# Patient Record
Sex: FEMALE | Race: WHITE | NOT HISPANIC OR LATINO | Employment: OTHER | URBAN - NONMETROPOLITAN AREA
[De-identification: names, ages, dates, MRNs, and addresses within clinical notes are randomized per-mention and may not be internally consistent; named-entity substitution may affect disease eponyms.]

---

## 2019-03-27 ENCOUNTER — OFFICE VISIT (OUTPATIENT)
Dept: SURGERY | Facility: CLINIC | Age: 69
End: 2019-03-27

## 2019-03-27 VITALS
TEMPERATURE: 97.7 F | HEART RATE: 81 BPM | OXYGEN SATURATION: 94 % | HEIGHT: 61 IN | SYSTOLIC BLOOD PRESSURE: 138 MMHG | DIASTOLIC BLOOD PRESSURE: 76 MMHG | BODY MASS INDEX: 29.83 KG/M2 | WEIGHT: 158 LBS

## 2019-03-27 DIAGNOSIS — R22.2 CHEST WALL MASS: Primary | ICD-10-CM

## 2019-03-27 PROCEDURE — 99212 OFFICE O/P EST SF 10 MIN: CPT | Performed by: SURGERY

## 2019-03-27 RX ORDER — EZETIMIBE 10 MG/1
TABLET ORAL DAILY
COMMUNITY
Start: 2016-01-02 | End: 2019-09-30

## 2019-03-27 RX ORDER — ATORVASTATIN CALCIUM 40 MG/1
TABLET, FILM COATED ORAL
Refills: 5 | COMMUNITY
Start: 2019-03-01 | End: 2019-09-30 | Stop reason: DRUGHIGH

## 2019-03-27 RX ORDER — SERTRALINE HYDROCHLORIDE 100 MG/1
TABLET, FILM COATED ORAL
Refills: 1 | COMMUNITY
Start: 2019-01-24

## 2019-03-27 RX ORDER — BISOPROLOL FUMARATE 5 MG/1
TABLET, FILM COATED ORAL
Refills: 3 | COMMUNITY
Start: 2019-02-21

## 2019-03-27 RX ORDER — DEXTROMETHORPHAN HYDROBROMIDE AND PROMETHAZINE HYDROCHLORIDE 15; 6.25 MG/5ML; MG/5ML
SYRUP ORAL
Refills: 0 | COMMUNITY
Start: 2019-03-12 | End: 2019-09-30

## 2019-03-27 RX ORDER — TRAZODONE HYDROCHLORIDE 100 MG/1
TABLET ORAL
Refills: 3 | COMMUNITY
Start: 2019-03-01

## 2019-03-27 RX ORDER — CALCIUM CITRATE/VITAMIN D3 200MG-6.25
TABLET ORAL
Refills: 11 | COMMUNITY
Start: 2019-03-12

## 2019-03-27 RX ORDER — SERTRALINE HYDROCHLORIDE 100 MG/1
TABLET, FILM COATED ORAL DAILY
COMMUNITY
Start: 2016-01-02 | End: 2019-09-30

## 2019-03-27 RX ORDER — OMEPRAZOLE 40 MG/1
CAPSULE, DELAYED RELEASE ORAL
Refills: 0 | COMMUNITY
Start: 2019-02-21

## 2019-03-27 RX ORDER — GLUCOSAM/CHON-MSM1/C/MANG/BOSW 500-416.6
TABLET ORAL
Refills: 11 | COMMUNITY
Start: 2019-03-12

## 2019-03-27 NOTE — PROGRESS NOTES
Patient: Lucy Pedro    YOB: 1950    Date: 03/27/2019    Primary Care Provider: Debbi Yu MD    Reason for consultation: Chest wall mass     Chief Complaint   Patient presents with   • Mass     Chest wall        Subjective .     History of present illness:  I saw the patient in the office  today for evaluation and treatment of mass chest wall. Patient states onset of mass 5 to 6 years however it has became larger within the past 2 months. Patient did have mammogram 2 weeks ago with BI-RADS 2. She denies any nipple drainage, discoloration or pain at this time.     Review of Systems   Constitutional: Negative for chills, fever and unexpected weight change.   HENT: Negative for hearing loss, trouble swallowing and voice change.    Eyes: Negative for visual disturbance.   Respiratory: Negative for apnea, cough, chest tightness, shortness of breath and wheezing.    Cardiovascular: Negative for chest pain, palpitations and leg swelling.   Gastrointestinal: Negative for abdominal distention, abdominal pain, anal bleeding, blood in stool, constipation, diarrhea, nausea, rectal pain and vomiting.   Endocrine: Negative for cold intolerance and heat intolerance.   Genitourinary: Negative for difficulty urinating, dysuria and flank pain.   Musculoskeletal: Negative for back pain and gait problem.   Skin: Negative for color change, rash and wound.   Neurological: Negative for dizziness, syncope, speech difficulty, weakness, light-headedness, numbness and headaches.   Hematological: Negative for adenopathy. Does not bruise/bleed easily.   Psychiatric/Behavioral: Negative for confusion. The patient is not nervous/anxious.        History:  Past Medical History:   Diagnosis Date   • Diabetes mellitus (CMS/HCC)    • Hyperlipidemia    • Hypertension           Past Surgical History:   Procedure Laterality Date   • CARPAL TUNNEL RELEASE     • CHOLECYSTECTOMY     • CYST REMOVAL     • HERNIA REPAIR    "  • HYSTERECTOMY     • KNEE SURGERY      bilateral       History reviewed. No pertinent family history.    Social History     Tobacco Use   • Smoking status: Former Smoker   Substance Use Topics   • Alcohol use: No     Frequency: Never   • Drug use: No       Allergies:  Allergies   Allergen Reactions   • Penicillins Hives       Medications:     Current Outpatient Medications:   •  ezetimibe (ZETIA) 10 MG tablet, Take  by mouth Daily., Disp: , Rfl:   •  sertraline (ZOLOFT) 100 MG tablet, Take  by mouth Daily., Disp: , Rfl:   •  atorvastatin (LIPITOR) 20 MG tablet, TAKE 1 TABLET EVERY EVENING TO LOWER CHOLESTEROL AND OR TRIGLYCERIDES, Disp: , Rfl: 5  •  bisoprolol (ZEBeta) 5 MG tablet, TAKE 1 TABLET EVERY DAY FOR BLOOD PRESSURE, Disp: , Rfl: 3  •  metFORMIN (GLUCOPHAGE) 850 MG tablet, TAKE 1 TABLET 2 TIMES A DAY WITH MORNING AND EVENING MEALS TO HELP BODY USE INSULIN BETTER AND TO LOWER BLOOD SUGAR, Disp: , Rfl: 5  •  omeprazole (priLOSEC) 40 MG capsule, TAKE 1 CAPSULE BY MOUTH DAILY AS NEEDED FOR HEART BURN, Disp: , Rfl: 0  •  promethazine-dextromethorphan (PROMETHAZINE-DM) 6.25-15 MG/5ML syrup, TAKE 5 ML BY MOUTH EVERY 6 HOURS AS NEEDED   MAY CAUSE DROWSINESS, Disp: , Rfl: 0  •  sertraline (ZOLOFT) 100 MG tablet, TAKE 1 TABLET EVERY DAY FOR MOOD AND OR ANXIETY, Disp: , Rfl: 1  •  traZODone (DESYREL) 100 MG tablet, TAKE 1 TABLET EVERY NIGHT AT BEDTIME FOR SLEEP, Disp: , Rfl: 3  •  TRUE METRIX BLOOD GLUCOSE TEST test strip, USE TO CHECK BLOOD SUGAR ONCE A DAY, Disp: , Rfl: 11  •  TRUEPLUS LANCETS 30G misc, USE TO TEST ONCE DAILY AS DIRECTED, Disp: , Rfl: 11    Objective     Vital Signs:   Vitals:    03/27/19 1335   BP: 138/76   Pulse: 81   Temp: 97.7 °F (36.5 °C)   SpO2: 94%   Weight: 71.7 kg (158 lb)   Height: 154.9 cm (61\")       Physical Exam:     General Appearance:    Alert, cooperative, in no acute distress   Head:    Normocephalic, without obvious abnormality, atraumatic   Eyes:            Lids and lashes " normal, conjunctivae and sclerae normal, no   icterus, no pallor, corneas clear,   Ears:    Ears appear intact with no abnormalities noted   Throat:   No oral lesions, no thrush, oral mucosa moist   Breast:     No palpable lesions present, lipoma present in mid sternal region   Lungs:     Clear to auscultation,respirations regular, even and                  Unlabored    Heart:    Regular rhythm and normal rate, no murmur, no gallop.   Chest Wall:    No abnormalities observed   Abdomen:     Normal bowel sounds, no masses, no organomegaly, soft        non-tender, non-distended, no guarding.   Extremities:   Moves all extremities well, no edema, no cyanosis, no             redness   Pulses:   Pulses palpable and equal bilaterally   Skin:   No bleeding, bruising or rash   Lymph nodes:   No palpable adenopathy   Neurologic:   Cranial nerves 2 - 12 grossly intact.           Results Review:   I reviewed the patient's new clinical results.  I reviewed the patient's new imaging results and agree with the interpretation.  I reviewed the patient's other test results and agree with the interpretation      Assessment / Plan:    1. Chest wall mass        I did have a detailed and extensive discussion with the patient in the office today and reviewed her recent workup.  I have told the patient that she needs to undergo excision of this lesion.  Risks and benefits discussed with her, she understands and agrees.    Electronically signed by Torsten Collins MD  03/28/19  11:48 AM

## 2019-04-18 ENCOUNTER — PROCEDURE VISIT (OUTPATIENT)
Dept: SURGERY | Facility: CLINIC | Age: 69
End: 2019-04-18

## 2019-04-18 VITALS
SYSTOLIC BLOOD PRESSURE: 140 MMHG | HEIGHT: 61 IN | OXYGEN SATURATION: 98 % | TEMPERATURE: 98 F | WEIGHT: 158.8 LBS | DIASTOLIC BLOOD PRESSURE: 80 MMHG | HEART RATE: 86 BPM | BODY MASS INDEX: 29.98 KG/M2

## 2019-04-18 DIAGNOSIS — R22.2 MASS OF CHEST WALL: Primary | ICD-10-CM

## 2019-04-18 DIAGNOSIS — L98.9 SKIN LESION: Primary | ICD-10-CM

## 2019-04-18 PROCEDURE — 11404 EXC TR-EXT B9+MARG 3.1-4 CM: CPT | Performed by: SURGERY

## 2019-04-18 PROCEDURE — 12032 INTMD RPR S/A/T/EXT 2.6-7.5: CPT | Performed by: SURGERY

## 2019-04-18 NOTE — PROGRESS NOTES
Location:chest wall mass    Procedure:I saw the patient in the office today for an excision mass @ chest wall.  Patient has given proper consent.       I recommend excision. Procedure and the risks and benefits were explained including bleeding and infection. The patient understands these and wishes to proceed.     The patient was brought to the procedure room. Consent and time out were performed. The area was prepped and draped in the usual fashion. 1% lidocaine with epinephrine was infused locally. An incision was made over the lesion. Excision was performed. The lesion size was 3.6 cm. The wound was closed in layers with interrupted simple vicryl and Nylon for the skin. Wound closure size was 4 cm. There were no complications and the patient tolerated the procedure well. Hemostasis was well controlled with pressure and there was minimal blood loss. Wound instructions were given.

## 2019-04-25 ENCOUNTER — OFFICE VISIT (OUTPATIENT)
Dept: SURGERY | Facility: CLINIC | Age: 69
End: 2019-04-25

## 2019-04-25 VITALS
TEMPERATURE: 98.4 F | SYSTOLIC BLOOD PRESSURE: 138 MMHG | HEIGHT: 61 IN | HEART RATE: 80 BPM | OXYGEN SATURATION: 98 % | BODY MASS INDEX: 29.98 KG/M2 | WEIGHT: 158.8 LBS | DIASTOLIC BLOOD PRESSURE: 88 MMHG

## 2019-04-25 DIAGNOSIS — Z48.89 POSTOPERATIVE VISIT: Primary | ICD-10-CM

## 2019-04-25 PROCEDURE — 99024 POSTOP FOLLOW-UP VISIT: CPT | Performed by: SURGERY

## 2019-04-25 NOTE — PROGRESS NOTES
"Patient: Lucy Pedro    YOB: 1950    Date: 04/25/2019    Primary Care Provider: Debbi Yu MD    Reason for Consultation: Follow-up lesion excision    Chief Complaint   Patient presents with   • Post-op       History of present illness:  I saw the patient in the office today as a followup from their recent lesion excision @ chest wall, the pathology report did show lipoma.  They state that they have done well and are having no problems.    The following portions of the patient's history were reviewed and updated as appropriate: allergies, current medications, past family history, past medical history, past social history, past surgical history and problem list.      Vital Signs:  Vitals:    04/25/19 1420   BP: 138/88   Pulse: 80   Temp: 98.4 °F (36.9 °C)   SpO2: 98%   Weight: 72 kg (158 lb 12.8 oz)   Height: 154.9 cm (61\")       Physical Exam:   General Appearance:    Alert, cooperative, in no acute distress, wound clean dry without infection   Abdomen:     no masses, no organomegaly, soft non-tender, non-distended, no guarding, wounds are well healed   Chest:      Clear to ausculation       Assessment / Plan:    1. Postoperative visit        I did discuss the situation with the patient today in the office and they have done well from their recent lesion excision, I don't think that the patient needs any further intervention and I need to see them back only if they have further problems. Pathology report was reviewed with the patient in the office.    Electronically signed by Torsten Collins MD  04/25/19                    "

## 2019-09-30 ENCOUNTER — OFFICE VISIT (OUTPATIENT)
Dept: GASTROENTEROLOGY | Facility: CLINIC | Age: 69
End: 2019-09-30

## 2019-09-30 VITALS
BODY MASS INDEX: 28.32 KG/M2 | HEART RATE: 71 BPM | HEIGHT: 61 IN | WEIGHT: 150 LBS | DIASTOLIC BLOOD PRESSURE: 56 MMHG | TEMPERATURE: 98.1 F | SYSTOLIC BLOOD PRESSURE: 126 MMHG | RESPIRATION RATE: 18 BRPM

## 2019-09-30 DIAGNOSIS — R63.4 WEIGHT LOSS: Chronic | ICD-10-CM

## 2019-09-30 DIAGNOSIS — R19.7 DIARRHEA, UNSPECIFIED TYPE: Primary | Chronic | ICD-10-CM

## 2019-09-30 DIAGNOSIS — Z12.11 COLON CANCER SCREENING: ICD-10-CM

## 2019-09-30 DIAGNOSIS — Z80.0 FAMILY HISTORY OF COLON CANCER IN FATHER: Chronic | ICD-10-CM

## 2019-09-30 DIAGNOSIS — R12 HEARTBURN: Chronic | ICD-10-CM

## 2019-09-30 PROBLEM — K57.30 DIVERTICULOSIS OF LARGE INTESTINE: Status: ACTIVE | Noted: 2019-09-30

## 2019-09-30 PROBLEM — K63.5 COLON POLYP: Status: ACTIVE | Noted: 2019-09-30

## 2019-09-30 PROCEDURE — 99204 OFFICE O/P NEW MOD 45 MIN: CPT | Performed by: NURSE PRACTITIONER

## 2019-09-30 RX ORDER — EZETIMIBE 10 MG/1
10 TABLET ORAL DAILY
COMMUNITY

## 2019-09-30 RX ORDER — SODIUM CHLORIDE 9 MG/ML
70 INJECTION, SOLUTION INTRAVENOUS CONTINUOUS PRN
Status: CANCELLED | OUTPATIENT
Start: 2019-09-30

## 2019-09-30 RX ORDER — MECLIZINE HCL 25MG 25 MG/1
25 TABLET, CHEWABLE ORAL 3 TIMES DAILY PRN
COMMUNITY

## 2019-09-30 RX ORDER — ASPIRIN 81 MG/1
81 TABLET ORAL NIGHTLY
COMMUNITY

## 2019-09-30 RX ORDER — ATORVASTATIN CALCIUM 20 MG/1
80 TABLET, FILM COATED ORAL EVERY EVENING
Refills: 5 | COMMUNITY
Start: 2019-09-03

## 2019-09-30 NOTE — PATIENT INSTRUCTIONS
1. Antireflux measures: Avoid fried, fatty foods, alcohol, chocolate, coffee, tea,  soft drinks, peppermint and spearmint, spicy foods, tomatoes and tomato based foods, onion based foods, and smoking. Other antireflux measures include weight reduction if overweight, avoiding tight clothing around the abdomen, elevating the head of the bed 6 inches with blocks under the head board, and don't drink or eat before going to bed and avoid lying down immediately after meals.  2. Omeprazole 40 mg 1 po daily in the am 30 minutes before breakfast.   3. Colonoscopy: Description of the procedure, risks, benefits, alternatives and options, including nonoperative options, were discussed with the patient in detail. The patient understands and wishes to proceed.

## 2019-09-30 NOTE — PROGRESS NOTES
Chief Complaint   Patient presents with   • Establish Care     Colon Screen    • Heartburn   • Diarrhea   • Weight Loss     There is a long standing history of diarrhea. The patient has diarrhea 4-5 days per week with 3-4 episodes per day. Stools are loose that will progress to watery. She has incontinence of bowels at times since she had hemorrhoid surgery in 2002. Eating triggers diarrhea. The patient takes Imodium as needed with significant control of diarrhea. There is no history of bright red blood per rectum or melena. There is no history of abdominal pain. Of interest, the patient takes Metformin.    The patient has lost about 20 pounds over the past year or so. Worsening over the past 6 months. This is described as unintentional. The patient denies nausea or vomiting.    There is a long standing history of heartburn. The patient takes Omeprazole with reasonable control of heartburn. Heartburn is moderate. Reflux is not worse at night. There is no history of difficulty swallowing.    The patient's last colonoscopy was in 2016. There is a family history of colon cancer in the patient's father.    Diarrhea    This is a chronic problem. Episode onset: over 5 years. Episode frequency: 4-5 days per week with 3-4 episodes per day. The problem has been unchanged. Diarrhea characteristics: loose progressing to watery. The patient states that diarrhea does not awaken her from sleep. Associated symptoms include arthralgias, coughing, headaches, myalgias and weight loss. Pertinent negatives include no abdominal pain, chills, fever or vomiting. Nothing aggravates the symptoms. There are no known risk factors. She has tried anti-motility drug for the symptoms. The treatment provided significant relief.   Heartburn   She complains of coughing and heartburn. She reports no abdominal pain, no chest pain or no nausea. This is a chronic problem. Episode onset: over 5 years ago. The problem occurs occasionally. The problem has  been unchanged. The heartburn duration is an hour. The heartburn is located in the substernum. The heartburn is of moderate intensity. The heartburn does not wake her from sleep. Nothing aggravates the symptoms. Associated symptoms include fatigue and weight loss. There are no known risk factors. She has tried a PPI for the symptoms. The treatment provided significant relief. Past procedures include an EGD (2016).   Weight Loss   This is a chronic problem. The current episode started more than 1 year ago. The problem occurs intermittently. The problem has been gradually worsening. Associated symptoms include arthralgias, coughing, fatigue, headaches, joint swelling and myalgias. Pertinent negatives include no abdominal pain, chest pain, chills, fever, nausea, rash or vomiting. Nothing aggravates the symptoms. She has tried nothing for the symptoms.     Review of Systems   Constitutional: Positive for fatigue, unexpected weight change and weight loss. Negative for appetite change, chills and fever.   HENT: Negative for mouth sores, nosebleeds and trouble swallowing.    Eyes: Negative for discharge and redness.   Respiratory: Positive for cough and shortness of breath. Negative for apnea.    Cardiovascular: Positive for leg swelling. Negative for chest pain and palpitations.   Gastrointestinal: Positive for diarrhea and heartburn. Negative for abdominal distention, abdominal pain, anal bleeding, blood in stool, constipation, nausea and vomiting.   Endocrine: Positive for polydipsia. Negative for cold intolerance and heat intolerance.   Genitourinary: Negative for dysuria, hematuria and urgency.   Musculoskeletal: Positive for arthralgias, joint swelling and myalgias.   Skin: Negative for rash.   Allergic/Immunologic: Positive for food allergies (cabbage, wheat, cucumbers, white potatoes). Negative for immunocompromised state.   Neurological: Positive for dizziness and headaches. Negative for seizures and syncope.    Hematological: Negative for adenopathy. Bruises/bleeds easily.   Psychiatric/Behavioral: Negative for dysphoric mood. The patient is nervous/anxious. The patient is not hyperactive.      Patient Active Problem List   Diagnosis   • Colon polyp   • Diarrhea   • Diverticulosis of large intestine   • Heartburn   • Weight loss   • Family history of colon cancer in father     Past Medical History:   Diagnosis Date   • Arthritis    • Back pain    • Back pain    • Colon polyp 02/24/2016   • Depression 1998   • Diabetes mellitus (CMS/HCC) 2002    Type 2   • Dizziness    • GERD (gastroesophageal reflux disease)    • High triglycerides 1998   • History of cataract    • History of kidney stones    • History of stroke    • Hyperlipidemia 1998   • Hypertension    • Kidney failure 2018   • Osteoarthritis    • PTSD (post-traumatic stress disorder) 1998     Past Surgical History:   Procedure Laterality Date   • APPENDECTOMY  1979   • CARDIAC PACEMAKER PLACEMENT  04/01/2011    [facility]   • CARPAL TUNNEL RELEASE     • CATARACT EXTRACTION     • CHOLECYSTECTOMY  2000   • COLONOSCOPY  02/24/2016   • CYST REMOVAL     • EYE SURGERY Bilateral 2006    Cataract    • HEMORRHOIDECTOMY  2002   • HERNIA REPAIR     • HERNIA REPAIR Right 2009   • HYSTERECTOMY     • HYSTERECTOMY  1979   • KNEE SURGERY Bilateral     1991 and 2003   • PACEMAKER IMPLANTATION  2011   • UPPER GASTROINTESTINAL ENDOSCOPY  03/02/2016     Family History   Problem Relation Age of Onset   • No Known Problems Mother    • Colon cancer Father    • Stomach cancer Paternal Aunt    • Ovarian cancer Paternal Aunt      Social History     Tobacco Use   • Smoking status: Current Every Day Smoker     Packs/day: 0.50     Types: Cigarettes   • Smokeless tobacco: Never Used   Substance Use Topics   • Alcohol use: Yes     Comment: HX: 2006       Current Outpatient Medications:   •  aspirin 81 MG EC tablet, Take 81 mg by mouth Daily., Disp: , Rfl:   •  atorvastatin (LIPITOR) 20 MG tablet, Take  "20 mg by mouth Every Evening., Disp: , Rfl: 5  •  bisoprolol (ZEBeta) 5 MG tablet, TAKE 1 TABLET EVERY DAY FOR BLOOD PRESSURE, Disp: , Rfl: 3  •  diphenhydrAMINE HCl (BENADRYL ALLERGY PO), Take  by mouth., Disp: , Rfl:   •  ezetimibe (ZETIA) 10 MG tablet, Take 10 mg by mouth Daily., Disp: , Rfl:   •  meclizine 25 MG chewable tablet chewable tablet, Chew 25 mg 3 (Three) Times a Day As Needed., Disp: , Rfl:   •  metFORMIN (GLUCOPHAGE) 850 MG tablet, TAKE 1 TABLET 2 TIMES A DAY WITH MORNING AND EVENING MEALS TO HELP BODY USE INSULIN BETTER AND TO LOWER BLOOD SUGAR, Disp: , Rfl: 5  •  omeprazole (priLOSEC) 40 MG capsule, TAKE 1 CAPSULE BY MOUTH DAILY AS NEEDED FOR HEART BURN, Disp: , Rfl: 0  •  sertraline (ZOLOFT) 100 MG tablet, TAKE 1 TABLET EVERY DAY FOR MOOD AND OR ANXIETY, Disp: , Rfl: 1  •  traZODone (DESYREL) 100 MG tablet, TAKE 1 TABLET EVERY NIGHT AT BEDTIME FOR SLEEP, Disp: , Rfl: 3  •  TRUE METRIX BLOOD GLUCOSE TEST test strip, USE TO CHECK BLOOD SUGAR ONCE A DAY, Disp: , Rfl: 11  •  TRUEPLUS LANCETS 30G misc, USE TO TEST ONCE DAILY AS DIRECTED, Disp: , Rfl: 11    Allergies   Allergen Reactions   • Diclofenac Unknown (See Comments)     Causing patient kidney failure    • Penicillins Hives   • Wheat Bran Unknown (See Comments)     ADDITIONAL FOOD ALLERGIES PER PATIENT: cabbage, wheat, cucumbers, white potatoes     Blood pressure 126/56, pulse 71, temperature 98.1 °F (36.7 °C), resp. rate 18, height 154.9 cm (61\"), weight 68 kg (150 lb), not currently breastfeeding.    Physical Exam   Constitutional: She is oriented to person, place, and time. She appears well-developed and well-nourished. No distress.   HENT:   Head: Normocephalic and atraumatic.   Right Ear: Hearing and external ear normal.   Left Ear: Hearing and external ear normal.   Nose: Nose normal.   Mouth/Throat: Oropharynx is clear and moist and mucous membranes are normal. Mucous membranes are not pale, not dry and not cyanotic. No oral lesions. No " oropharyngeal exudate.   Eyes: Conjunctivae and EOM are normal. Right eye exhibits no discharge. Left eye exhibits no discharge.   Neck: Trachea normal. Neck supple. No JVD present. No edema present. No thyroid mass and no thyromegaly present.   Cardiovascular: Normal rate, regular rhythm, S2 normal and normal heart sounds. Exam reveals no gallop, no S3 and no friction rub.   No murmur heard.  Pulmonary/Chest: Effort normal and breath sounds normal. No respiratory distress. She exhibits no tenderness.   Abdominal: Normal appearance and bowel sounds are normal. She exhibits no distension, no ascites and no mass. There is no splenomegaly or hepatomegaly. There is no tenderness. There is no rigidity, no rebound and no guarding. No hernia.     Vascular Status -  Her right foot exhibits no edema. Her left foot exhibits no edema.  Lymphadenopathy:     She has no cervical adenopathy.        Left: No supraclavicular adenopathy present.   Neurological: She is alert and oriented to person, place, and time. She has normal strength. No cranial nerve deficit or sensory deficit.   Skin: No rash noted. She is not diaphoretic. No cyanosis. No pallor. Nails show no clubbing.   Psychiatric: She has a normal mood and affect.   Nursing note and vitals reviewed.  Stigmata of chronic liver disease:  None.  Asterixis:  None.    Laboratory Tests:   Upon review of records:    Dated 8/21/2019 glucose 95 BUN 12 creatinine 0.95 sodium 143 potassium 4.5 chloride 112 CO2 17 calcium 9.3 albumin 4.4 total bilirubin 0.3 alkaline phosphatase 78 ALT 15 AST 21 WBC 9.4 hemoglobin 12.4 hematocrit 40.2 platelet count 253 MCV 95 hemoglobin A1c 6.1 TSH 1.060    Procedures:  Upon review of records:    Colonoscopy dated 02/24/2016: Scant early diverticular change in the left colon. Colon polyps. Evidence of healed ulceration within the rectum. Minimal linear erythema within the rectum. Internal hemorrhoids. Cecum polyp, biopsy revealed tubular adenoma  fragments without high-grade dysplasia. Transverse colon polyps, biopsy revealed tubular adenoma fragments without high-grade dysplasia. Rectum, biopsies revealed mucosal prolapse changes. Mucosal lymphoid aggregates. Random colon biopsy revealed mucosal prolapse changes with reactive lymphoid aggregate.     EGD dated 03/02/2016: Erythematous gastritis involving the antrum and body of the stomach. Small sliding hiatal hernia less than 3 cm. Nonobstructive Schatzki's-type ring. No Jeff's esophagus. Second portion of duodenum, biopsy revealed no pathologic alterations. Negative for celiac disease, microorganisms, metaplasia or atypia. Antrum and body, and angulus, biopsies revealed reactive gastropathy (antrum). Minimal chronic gastritis (body). Negative for H. pylori, metaplasia, dysplasia or malignancy.    Assessment:      ICD-10-CM ICD-9-CM   1. Diarrhea, unspecified type R19.7 787.91   2. Weight loss R63.4 783.21   3. Family history of colon cancer in father Z80.0 V16.0   4. Colon cancer screening Z12.11 V76.51   5. Heartburn R12 787.1       Plan/  Patient Instructions   1. Antireflux measures: Avoid fried, fatty foods, alcohol, chocolate, coffee, tea,  soft drinks, peppermint and spearmint, spicy foods, tomatoes and tomato based foods, onion based foods, and smoking. Other antireflux measures include weight reduction if overweight, avoiding tight clothing around the abdomen, elevating the head of the bed 6 inches with blocks under the head board, and don't drink or eat before going to bed and avoid lying down immediately after meals.  2. Omeprazole 40 mg 1 po daily in the am 30 minutes before breakfast.   3. Colonoscopy: Description of the procedure, risks, benefits, alternatives and options, including nonoperative options, were discussed with the patient in detail. The patient understands and wishes to proceed.     Emeli Woodward, RUPINDER

## 2019-11-07 PROBLEM — Z12.11 COLON CANCER SCREENING: Status: ACTIVE | Noted: 2019-11-07

## 2019-11-21 ENCOUNTER — ANESTHESIA EVENT (OUTPATIENT)
Dept: GASTROENTEROLOGY | Facility: HOSPITAL | Age: 69
End: 2019-11-21

## 2019-11-21 ENCOUNTER — ANESTHESIA (OUTPATIENT)
Dept: GASTROENTEROLOGY | Facility: HOSPITAL | Age: 69
End: 2019-11-21

## 2019-11-21 ENCOUNTER — HOSPITAL ENCOUNTER (OUTPATIENT)
Facility: HOSPITAL | Age: 69
Setting detail: HOSPITAL OUTPATIENT SURGERY
Discharge: HOME | End: 2019-11-21
Attending: INTERNAL MEDICINE | Admitting: INTERNAL MEDICINE

## 2019-11-21 VITALS
BODY MASS INDEX: 27.38 KG/M2 | DIASTOLIC BLOOD PRESSURE: 70 MMHG | SYSTOLIC BLOOD PRESSURE: 98 MMHG | WEIGHT: 145 LBS | OXYGEN SATURATION: 95 % | HEART RATE: 70 BPM | RESPIRATION RATE: 18 BRPM | HEIGHT: 61 IN | TEMPERATURE: 98 F

## 2019-11-21 DIAGNOSIS — R63.4 WEIGHT LOSS: ICD-10-CM

## 2019-11-21 DIAGNOSIS — Z80.0 FAMILY HISTORY OF COLON CANCER IN FATHER: ICD-10-CM

## 2019-11-21 DIAGNOSIS — Z12.11 COLON CANCER SCREENING: ICD-10-CM

## 2019-11-21 DIAGNOSIS — R19.7 DIARRHEA, UNSPECIFIED TYPE: ICD-10-CM

## 2019-11-21 LAB — GLUCOSE BLDC GLUCOMTR-MCNC: 115 MG/DL (ref 70–130)

## 2019-11-21 PROCEDURE — 25010000002 ONDANSETRON PER 1 MG: Performed by: NURSE ANESTHETIST, CERTIFIED REGISTERED

## 2019-11-21 PROCEDURE — 25010000002 PROPOFOL 200 MG/20ML EMULSION: Performed by: NURSE ANESTHETIST, CERTIFIED REGISTERED

## 2019-11-21 PROCEDURE — S0260 H&P FOR SURGERY: HCPCS | Performed by: INTERNAL MEDICINE

## 2019-11-21 PROCEDURE — 45380 COLONOSCOPY AND BIOPSY: CPT | Performed by: INTERNAL MEDICINE

## 2019-11-21 PROCEDURE — 82962 GLUCOSE BLOOD TEST: CPT

## 2019-11-21 RX ORDER — CHOLESTYRAMINE 4 G/9G
4 POWDER, FOR SUSPENSION ORAL NIGHTLY
Qty: 30 EACH | Refills: 1 | Status: SHIPPED | OUTPATIENT
Start: 2019-11-21 | End: 2019-12-19 | Stop reason: SINTOL

## 2019-11-21 RX ORDER — ONDANSETRON 2 MG/ML
INJECTION INTRAMUSCULAR; INTRAVENOUS AS NEEDED
Status: DISCONTINUED | OUTPATIENT
Start: 2019-11-21 | End: 2019-11-21 | Stop reason: SURG

## 2019-11-21 RX ORDER — LIDOCAINE HYDROCHLORIDE 20 MG/ML
INJECTION, SOLUTION INTRAVENOUS AS NEEDED
Status: DISCONTINUED | OUTPATIENT
Start: 2019-11-21 | End: 2019-11-21 | Stop reason: SURG

## 2019-11-21 RX ORDER — SODIUM CHLORIDE 9 MG/ML
70 INJECTION, SOLUTION INTRAVENOUS CONTINUOUS PRN
Status: DISCONTINUED | OUTPATIENT
Start: 2019-11-21 | End: 2019-11-21 | Stop reason: HOSPADM

## 2019-11-21 RX ORDER — LIDOCAINE 50 MG/G
OINTMENT TOPICAL AS NEEDED
Status: DISCONTINUED | OUTPATIENT
Start: 2019-11-21 | End: 2019-11-21 | Stop reason: HOSPADM

## 2019-11-21 RX ORDER — SIMETHICONE 20 MG/.3ML
EMULSION ORAL AS NEEDED
Status: DISCONTINUED | OUTPATIENT
Start: 2019-11-21 | End: 2019-11-21 | Stop reason: HOSPADM

## 2019-11-21 RX ORDER — PROPOFOL 10 MG/ML
INJECTION, EMULSION INTRAVENOUS AS NEEDED
Status: DISCONTINUED | OUTPATIENT
Start: 2019-11-21 | End: 2019-11-21 | Stop reason: SURG

## 2019-11-21 RX ADMIN — ONDANSETRON 4 MG: 2 INJECTION INTRAMUSCULAR; INTRAVENOUS at 09:48

## 2019-11-21 RX ADMIN — SODIUM CHLORIDE 70 ML/HR: 9 INJECTION, SOLUTION INTRAVENOUS at 09:31

## 2019-11-21 RX ADMIN — PROPOFOL 50 MG: 10 INJECTION, EMULSION INTRAVENOUS at 09:51

## 2019-11-21 RX ADMIN — PROPOFOL 40 MG: 10 INJECTION, EMULSION INTRAVENOUS at 09:48

## 2019-11-21 RX ADMIN — PROPOFOL 50 MG: 10 INJECTION, EMULSION INTRAVENOUS at 09:55

## 2019-11-21 RX ADMIN — PROPOFOL 50 MG: 10 INJECTION, EMULSION INTRAVENOUS at 10:00

## 2019-11-21 RX ADMIN — PROPOFOL 60 MG: 10 INJECTION, EMULSION INTRAVENOUS at 09:44

## 2019-11-21 RX ADMIN — LIDOCAINE HYDROCHLORIDE 40 MG: 20 INJECTION, SOLUTION INTRAVENOUS at 09:44

## 2019-11-21 NOTE — ANESTHESIA POSTPROCEDURE EVALUATION
Patient: Lucy Pedro    Procedure Summary     Date:  11/21/19 Room / Location:  [facility] ENDOSCOPY 2 / [facility] ENDOSCOPY    Anesthesia Start:  0937 Anesthesia Stop:  1017    Procedure:  COLONOSCOPY WITH COLD BIOPSY POLYPECTOMY AND BIOPSIES (N/A Anus) Diagnosis:       Diverticulosis      Colon polyps      Internal hemorrhoid      (Diarrhea, unspecified type [R19.7])      (Weight loss [R63.4])      (Family history of colon cancer in father [Z80.0])      (Colon cancer screening [Z12.11])    Surgeon:  Qamar Baird MD Provider:  Fernando Sanchez CRNA    Anesthesia Type:  MAC ASA Status:  3          Anesthesia Type: MAC  Last vitals  BP   98/70 (11/21/19 1050)   Temp   98 °F (36.7 °C) (11/21/19 1020)   Pulse   70 (11/21/19 1050)   Resp   18 (11/21/19 1050)     SpO2   95 % (11/21/19 1050)     Post Anesthesia Care and Evaluation    Patient location during evaluation: bedside  Patient participation: complete - patient participated  Level of consciousness: awake and sleepy but conscious  Pain management: adequate  Airway patency: patent  Anesthetic complications: No anesthetic complications  PONV Status: none  Cardiovascular status: acceptable and hemodynamically stable  Respiratory status: acceptable, room air, nonlabored ventilation and spontaneous ventilation  Hydration status: acceptable

## 2019-11-27 LAB
LAB AP CASE REPORT: NORMAL
PATH REPORT.FINAL DX SPEC: NORMAL

## 2019-12-19 ENCOUNTER — OFFICE VISIT (OUTPATIENT)
Dept: GASTROENTEROLOGY | Facility: CLINIC | Age: 69
End: 2019-12-19

## 2019-12-19 VITALS
HEIGHT: 61 IN | RESPIRATION RATE: 16 BRPM | SYSTOLIC BLOOD PRESSURE: 135 MMHG | BODY MASS INDEX: 27 KG/M2 | TEMPERATURE: 98 F | HEART RATE: 72 BPM | WEIGHT: 143 LBS | DIASTOLIC BLOOD PRESSURE: 53 MMHG

## 2019-12-19 DIAGNOSIS — R63.4 WEIGHT LOSS: Chronic | ICD-10-CM

## 2019-12-19 DIAGNOSIS — R10.32 LEFT LOWER QUADRANT ABDOMINAL PAIN: Primary | ICD-10-CM

## 2019-12-19 DIAGNOSIS — R12 HEARTBURN: Chronic | ICD-10-CM

## 2019-12-19 DIAGNOSIS — R19.7 DIARRHEA, UNSPECIFIED TYPE: Chronic | ICD-10-CM

## 2019-12-19 DIAGNOSIS — K59.00 CONSTIPATION, UNSPECIFIED CONSTIPATION TYPE: ICD-10-CM

## 2019-12-19 PROCEDURE — 99214 OFFICE O/P EST MOD 30 MIN: CPT | Performed by: NURSE PRACTITIONER

## 2019-12-19 RX ORDER — METRONIDAZOLE 250 MG/1
TABLET ORAL
Qty: 28 TABLET | Refills: 0 | Status: ON HOLD | OUTPATIENT
Start: 2019-12-19 | End: 2022-09-12

## 2019-12-19 NOTE — PROGRESS NOTES
Chief Complaint   Patient presents with   • Follow-up     For the past 1-2 weeks, the patient has been having constipation. She may have 1 firm bowel movement every 3-4 days. This is a change in bowel habits. She normally has diarrhea 4-5 days per week with 3-4 episodes per day. Stools are usually loose when she is having diarrhea. She tried taking Cholestyramine after colonoscopy, but it caused constipation and she stopped taking it. It was not long after she stopped it, that constipation returned. The patient denies bright red blood per rectum or melena. She has not noticed abdominal pain.     The patient had lost about 20 pounds over the past year or so. Weight has been stable. This was described as unintentional. The patient denies nausea or vomiting.     There is a long standing history of heartburn. The patient takes Omeprazole with reasonable control of heartburn. Heartburn is moderate. Reflux is not worse at night. There is no history of difficulty swallowing.    Diarrhea    This is a chronic problem. Episode onset: over 5 years ago. The problem occurs less than 2 times per day. The problem has been waxing and waning. Diarrhea characteristics: loose. The patient states that diarrhea does not awaken her from sleep. Associated symptoms include abdominal pain, arthralgias and weight loss. Pertinent negatives include no chills, coughing, fever, headaches, myalgias or vomiting. Nothing aggravates the symptoms. There are no known risk factors. She has tried nothing for the symptoms.   Heartburn   She complains of abdominal pain and heartburn. She reports no chest pain, no coughing or no nausea. This is a chronic problem. Episode onset: over 5 years ago. The problem occurs occasionally. The problem has been rapidly improving. The heartburn duration is an hour. The heartburn is located in the substernum. The heartburn is of moderate intensity. The heartburn does not wake her from sleep. Nothing aggravates the  symptoms. Associated symptoms include fatigue and weight loss. There are no known risk factors. She has tried a PPI for the symptoms. The treatment provided significant relief.   Weight Loss   This is a chronic problem. The current episode started more than 1 year ago. The problem occurs intermittently. The problem has been unchanged (stable). Associated symptoms include abdominal pain, arthralgias, fatigue and joint swelling. Pertinent negatives include no chest pain, chills, coughing, fever, headaches, myalgias, nausea, rash or vomiting. Nothing aggravates the symptoms. She has tried nothing for the symptoms.     Review of Systems   Constitutional: Positive for fatigue and weight loss. Negative for appetite change, chills, fever and unexpected weight change.   HENT: Negative for mouth sores, nosebleeds and trouble swallowing.    Eyes: Negative for discharge and redness.   Respiratory: Positive for shortness of breath. Negative for apnea and cough.    Cardiovascular: Positive for leg swelling. Negative for chest pain and palpitations.   Gastrointestinal: Positive for abdominal pain, constipation and heartburn. Negative for abdominal distention, anal bleeding, blood in stool, diarrhea, nausea and vomiting.   Endocrine: Positive for polydipsia. Negative for cold intolerance and heat intolerance.   Genitourinary: Negative for dysuria, hematuria and urgency.   Musculoskeletal: Positive for arthralgias and joint swelling. Negative for myalgias.   Skin: Negative for rash.   Allergic/Immunologic: Positive for food allergies. Negative for immunocompromised state.   Neurological: Negative for dizziness, seizures, syncope and headaches.   Hematological: Negative for adenopathy. Bruises/bleeds easily.   Psychiatric/Behavioral: Negative for dysphoric mood. The patient is nervous/anxious. The patient is not hyperactive.      Patient Active Problem List   Diagnosis   • Colon polyp   • Diarrhea   • Diverticulosis of large  intestine   • Heartburn   • Weight loss   • Family history of colon cancer in father   • Colon cancer screening     Past Medical History:   Diagnosis Date   • Arthritis    • Back pain    • Back pain    • Colon polyp 02/24/2016   • Colon polyps 11/21/2019   • Depression 1998   • Diabetes mellitus (CMS/HCC) 2002    Type 2   • Dizziness    • GERD (gastroesophageal reflux disease)    • High triglycerides 1998   • History of cataract    • History of kidney stones    • History of stroke    • Hyperlipidemia 1998   • Hypertension    • Kidney failure 2018   • Osteoarthritis    • PONV (postoperative nausea and vomiting)    • PTSD (post-traumatic stress disorder) 1998   • Shortness of breath      Past Surgical History:   Procedure Laterality Date   • APPENDECTOMY  1979   • CARDIAC PACEMAKER PLACEMENT  04/01/2011    [facility]   • CARPAL TUNNEL RELEASE     • CATARACT EXTRACTION     • CHOLECYSTECTOMY  2000   • COLONOSCOPY  02/24/2016   • COLONOSCOPY N/A 11/21/2019    Procedure: COLONOSCOPY WITH COLD BIOPSY POLYPECTOMY AND BIOPSIES;  Surgeon: Qamar Baird MD;  Location: [facility] ENDOSCOPY;  Service: Gastroenterology   • CYST REMOVAL     • EYE SURGERY Bilateral 2006    Cataract    • HEMORRHOIDECTOMY  2002   • HERNIA REPAIR     • HERNIA REPAIR Right 2009   • HYSTERECTOMY     • HYSTERECTOMY  1979   • KNEE SURGERY Bilateral     1991 and 2003   • PACEMAKER IMPLANTATION  2011   • UPPER GASTROINTESTINAL ENDOSCOPY  03/02/2016     Family History   Problem Relation Age of Onset   • No Known Problems Mother    • Colon cancer Father    • Stomach cancer Paternal Aunt    • Ovarian cancer Paternal Aunt      Social History     Tobacco Use   • Smoking status: Current Every Day Smoker     Packs/day: 0.50     Types: Cigarettes   • Smokeless tobacco: Never Used   Substance Use Topics   • Alcohol use: No     Frequency: Never     Comment: HX: 2006       Current Outpatient Medications:   •  aspirin 81 MG EC tablet, Take 81 mg by mouth Daily., Disp: , Rfl:  "  •  atorvastatin (LIPITOR) 20 MG tablet, Take 20 mg by mouth Every Evening., Disp: , Rfl: 5  •  bisoprolol (ZEBeta) 5 MG tablet, TAKE 1 TABLET EVERY DAY FOR BLOOD PRESSURE, Disp: , Rfl: 3  •  diphenhydrAMINE HCl (BENADRYL ALLERGY PO), Take 1 tablet by mouth 2 (Two) Times a Day., Disp: , Rfl:   •  ezetimibe (ZETIA) 10 MG tablet, Take 10 mg by mouth Daily., Disp: , Rfl:   •  meclizine 25 MG chewable tablet chewable tablet, Chew 25 mg 3 (Three) Times a Day As Needed., Disp: , Rfl:   •  metFORMIN (GLUCOPHAGE) 850 MG tablet, TAKE 1 TABLET 2 TIMES A DAY WITH MORNING AND EVENING MEALS TO HELP BODY USE INSULIN BETTER AND TO LOWER BLOOD SUGAR, Disp: , Rfl: 5  •  omeprazole (priLOSEC) 40 MG capsule, TAKE 1 CAPSULE BY MOUTH DAILY AS NEEDED FOR HEART BURN, Disp: , Rfl: 0  •  sertraline (ZOLOFT) 100 MG tablet, TAKE 1 TABLET EVERY DAY FOR MOOD AND OR ANXIETY, Disp: , Rfl: 1  •  traZODone (DESYREL) 100 MG tablet, TAKE 1 TABLET EVERY NIGHT AT BEDTIME FOR SLEEP, Disp: , Rfl: 3  •  metroNIDAZOLE (FLAGYL) 250 MG tablet, Take 1 tablet four times daily x 7 days, Disp: 28 tablet, Rfl: 0  •  Probiotic capsule, Take 1 capsule by mouth Daily., Disp: 30 capsule, Rfl: 1  •  TRUE METRIX BLOOD GLUCOSE TEST test strip, USE TO CHECK BLOOD SUGAR ONCE A DAY, Disp: , Rfl: 11  •  TRUEPLUS LANCETS 30G misc, USE TO TEST ONCE DAILY AS DIRECTED, Disp: , Rfl: 11    Allergies   Allergen Reactions   • Diclofenac Unknown (See Comments)     Causing patient kidney failure    • Penicillins Hives   • Wheat Bran Unknown (See Comments)     ADDITIONAL FOOD ALLERGIES PER PATIENT: cabbage, wheat, cucumbers, white potatoes   • Augmentin [Amoxicillin-Pot Clavulanate] Rash     /53   Pulse 72   Temp 98 °F (36.7 °C)   Resp 16   Ht 154.9 cm (61\")   Wt 64.9 kg (143 lb)   LMP  (LMP Unknown)   BMI 27.02 kg/m²     Physical Exam   Constitutional: She is oriented to person, place, and time. She appears well-developed and well-nourished. No distress.   HENT:   Head: " Normocephalic and atraumatic.   Right Ear: Hearing and external ear normal.   Left Ear: Hearing and external ear normal.   Nose: Nose normal.   Mouth/Throat: Oropharynx is clear and moist and mucous membranes are normal. Mucous membranes are not pale, not dry and not cyanotic. No oral lesions. No oropharyngeal exudate.   Eyes: Conjunctivae and EOM are normal. Right eye exhibits no discharge. Left eye exhibits no discharge.   Neck: Trachea normal. Neck supple. No JVD present. No edema present. No thyroid mass and no thyromegaly present.   Cardiovascular: Normal rate, regular rhythm, S2 normal and normal heart sounds. Exam reveals no gallop, no S3 and no friction rub.   No murmur heard.  Pulmonary/Chest: Effort normal and breath sounds normal. No respiratory distress. She exhibits no tenderness.   Abdominal: Normal appearance and bowel sounds are normal. She exhibits no distension, no ascites and no mass. There is no splenomegaly or hepatomegaly. There is tenderness (moderate) in the left lower quadrant. There is no rigidity, no rebound and no guarding. No hernia.     Vascular Status -  Her right foot exhibits no edema. Her left foot exhibits no edema.  Lymphadenopathy:     She has no cervical adenopathy.        Left: No supraclavicular adenopathy present.   Neurological: She is alert and oriented to person, place, and time. She has normal strength. No cranial nerve deficit or sensory deficit.   Skin: No rash noted. She is not diaphoretic. No cyanosis. No pallor. Nails show no clubbing.   Psychiatric: She has a normal mood and affect.   Nursing note and vitals reviewed.  Stigmata of chronic liver disease:  None.  Asterixis:  None.    Laboratory Tests:   Upon review of records:    Dated 8/21/2019 glucose 95 BUN 12 creatinine 0.95 sodium 143 potassium 4.5 chloride 112 CO2 17 calcium 9.3 albumin 4.4 total bilirubin 0.3 alkaline phosphatase 78 ALT 15 AST 21 WBC 9.4 hemoglobin 12.4 hematocrit 40.2 platelet count 253 MCV 95  hemoglobin A1c 6.1 TSH 1.060    Procedures:  Upon review of records:    Colonoscopy dated 02/24/2016: Scant early diverticular change in the left colon. Colon polyps. Evidence of healed ulceration within the rectum. Minimal linear erythema within the rectum. Internal hemorrhoids. Cecum polyp, biopsy revealed tubular adenoma fragments without high-grade dysplasia. Transverse colon polyps, biopsy revealed tubular adenoma fragments without high-grade dysplasia. Rectum, biopsies revealed mucosal prolapse changes. Mucosal lymphoid aggregates. Random colon biopsy revealed mucosal prolapse changes with reactive lymphoid aggregate.     EGD dated 03/02/2016: Erythematous gastritis involving the antrum and body of the stomach. Small sliding hiatal hernia less than 3 cm. Nonobstructive Schatzki's-type ring. No Jeff's esophagus. Second portion of duodenum, biopsy revealed no pathologic alterations. Negative for celiac disease, microorganisms, metaplasia or atypia. Antrum and body, and angulus, biopsies revealed reactive gastropathy (antrum). Minimal chronic gastritis (body). Negative for H. pylori, metaplasia, dysplasia or malignancy.    Colonoscopy dated 11/21/2019 reveals scant early diverticular change in the left colon.  Polyps.  3 were removed.  3 mm in size.  Internal hemorrhoids.  No endoscopic evidence of colitis was seen.  Random biopsies were obtained from the colon upon withdrawal of the scope.  Some erythema and excoriation of the skin was noted in the perineal area.  Terminal ileum biopsy revealed small bowel mucosa with no significant pathologic change.  Random colon biopsy reveals colonic mucosa with no significant pathologic change.  Rectal polyp biopsy reveals colonic mucosa with lymphoid aggregate.  Negative for dysplasia or malignancy.  Sigmoid colon polyps biopsy revealed hyperplastic polyps.  Negative for dysplasia or malignancy.    Assessment:      ICD-10-CM ICD-9-CM   1. Left lower quadrant abdominal  pain R10.32 789.04   2. Constipation, unspecified constipation type K59.00 564.00   3. Diarrhea, unspecified type R19.7 787.91   4. Heartburn R12 787.1   5. Weight loss R63.4 783.21     Plan/  Patient Instructions   1. Antireflux measures: Avoid fried, fatty foods, alcohol, chocolate, coffee, tea,  soft drinks, peppermint and spearmint, spicy foods, tomatoes and tomato based foods, onion based foods, and smoking. Other antireflux measures include weight reduction if overweight, avoiding tight clothing around the abdomen, elevating the head of the bed 6 inches with blocks under the head board, and don't drink or eat before going to bed and avoid lying down immediately after meals.  2. Omeprazole 40 mg 1 by mouth in the am 30 minutes before breakfast.  3. High fiber diet with liberal water intake after completing treatment for diverticulitis.  4. The patient may take Imodium 1/2 -1 caplet 1-2 times per day as needed for diarrhea after completing treatment for diverticulitis. May increase to 3-4 times per day if needed. Avoid getting constipated.  5.  Treatment for diverticulitis:  A. Low-fat low fiber diet for 5 days thereafter low-fat high-fiber diet.   B. Cipro (ciprofloxacin) tablets 500 mg. Take 1 tablet by mouth twice a day for 7 days. Side effects were discussed. (The patient was prescribed this by PCP this morning for UTI)  C. Flagyl (metronidazole) tablets 250 mg. Take 1 tablet one by mouth 4 times a day for 7 days. Side effects were discussed.  D. Avoid laxatives, enemas for next 5 days. However, for constipation the patient may use stool softeners.  E. Patient may take probiotics while taking antibiotics, and continue for an additional 1-2 weeks.  F. The patient is to call in 1 week with update.   6. Follow up colonoscopy in 3 years.   7. Follow up: 6 months or sooner if needed     Emeli Woodward, APRN

## 2019-12-19 NOTE — PATIENT INSTRUCTIONS
1. Antireflux measures: Avoid fried, fatty foods, alcohol, chocolate, coffee, tea,  soft drinks, peppermint and spearmint, spicy foods, tomatoes and tomato based foods, onion based foods, and smoking. Other antireflux measures include weight reduction if overweight, avoiding tight clothing around the abdomen, elevating the head of the bed 6 inches with blocks under the head board, and don't drink or eat before going to bed and avoid lying down immediately after meals.  2. Omeprazole 40 mg 1 by mouth in the am 30 minutes before breakfast.  3. High fiber diet with liberal water intake after completing treatment for diverticulitis.  4. The patient may take Imodium 1/2 -1 caplet 1-2 times per day as needed for diarrhea after completing treatment for diverticulitis. May increase to 3-4 times per day if needed. Avoid getting constipated.  5.  Treatment for diverticulitis:  A. Low-fat low fiber diet for 5 days thereafter low-fat high-fiber diet.   B. Cipro (ciprofloxacin) tablets 500 mg. Take 1 tablet by mouth twice a day for 7 days. Side effects were discussed. (The patient was prescribed this by PCP this morning for UTI)  C. Flagyl (metronidazole) tablets 250 mg. Take 1 tablet one by mouth 4 times a day for 7 days. Side effects were discussed.  D. Avoid laxatives, enemas for next 5 days. However, for constipation the patient may use stool softeners.  E. Patient may take probiotics while taking antibiotics, and continue for an additional 1-2 weeks.  F. The patient is to call in 1 week with update.   6. Follow up colonoscopy in 3 years.   7. Follow up: 6 months or sooner if needed

## 2020-10-27 ENCOUNTER — LAB (OUTPATIENT)
Dept: LAB | Facility: HOSPITAL | Age: 70
End: 2020-10-27

## 2020-10-27 ENCOUNTER — TRANSCRIBE ORDERS (OUTPATIENT)
Dept: LAB | Facility: HOSPITAL | Age: 70
End: 2020-10-27

## 2020-10-27 DIAGNOSIS — N18.31 CHRONIC KIDNEY DISEASE (CKD) STAGE G3A/A1, MODERATELY DECREASED GLOMERULAR FILTRATION RATE (GFR) BETWEEN 45-59 ML/MIN/1.73 SQUARE METER AND ALBUMINURIA CREATININE RATIO LESS THAN 30 MG/G (CMS/H* (HCC): ICD-10-CM

## 2020-10-27 DIAGNOSIS — N18.31 CHRONIC KIDNEY DISEASE (CKD) STAGE G3A/A1, MODERATELY DECREASED GLOMERULAR FILTRATION RATE (GFR) BETWEEN 45-59 ML/MIN/1.73 SQUARE METER AND ALBUMINURIA CREATININE RATIO LESS THAN 30 MG/G (CMS/H* (HCC): Primary | ICD-10-CM

## 2020-10-27 LAB
ALBUMIN SERPL-MCNC: 4.3 G/DL (ref 3.5–5.2)
ANION GAP SERPL CALCULATED.3IONS-SCNC: 10.2 MMOL/L (ref 5–15)
BUN SERPL-MCNC: 19 MG/DL (ref 8–23)
BUN/CREAT SERPL: 17.6 (ref 7–25)
CALCIUM SPEC-SCNC: 9.7 MG/DL (ref 8.6–10.5)
CHLORIDE SERPL-SCNC: 109 MMOL/L (ref 98–107)
CO2 SERPL-SCNC: 21.8 MMOL/L (ref 22–29)
CREAT SERPL-MCNC: 1.08 MG/DL (ref 0.57–1)
GFR SERPL CREATININE-BSD FRML MDRD: 50 ML/MIN/1.73
GLUCOSE SERPL-MCNC: 117 MG/DL (ref 65–99)
PHOSPHATE SERPL-MCNC: 3.5 MG/DL (ref 2.5–4.5)
POTASSIUM SERPL-SCNC: 5.3 MMOL/L (ref 3.5–5.2)
SODIUM SERPL-SCNC: 141 MMOL/L (ref 136–145)

## 2020-10-27 PROCEDURE — 80069 RENAL FUNCTION PANEL: CPT

## 2020-10-27 PROCEDURE — 36415 COLL VENOUS BLD VENIPUNCTURE: CPT

## 2021-12-01 ENCOUNTER — TRANSCRIBE ORDERS (OUTPATIENT)
Dept: LAB | Facility: HOSPITAL | Age: 71
End: 2021-12-01

## 2021-12-01 ENCOUNTER — LAB (OUTPATIENT)
Dept: LAB | Facility: HOSPITAL | Age: 71
End: 2021-12-01

## 2021-12-01 DIAGNOSIS — E55.9 VITAMIN D INSUFFICIENCY: ICD-10-CM

## 2021-12-01 DIAGNOSIS — N18.31 CHRONIC KIDNEY DISEASE (CKD) STAGE G3A/A1, MODERATELY DECREASED GLOMERULAR FILTRATION RATE (GFR) BETWEEN 45-59 ML/MIN/1.73 SQUARE METER AND ALBUMINURIA CREATININE RATIO LESS THAN 30 MG/G (CMS/H* (HCC): Primary | ICD-10-CM

## 2021-12-01 DIAGNOSIS — N18.31 CHRONIC KIDNEY DISEASE (CKD) STAGE G3A/A1, MODERATELY DECREASED GLOMERULAR FILTRATION RATE (GFR) BETWEEN 45-59 ML/MIN/1.73 SQUARE METER AND ALBUMINURIA CREATININE RATIO LESS THAN 30 MG/G (CMS/H* (HCC): ICD-10-CM

## 2021-12-01 LAB
ALBUMIN SERPL-MCNC: 4.1 G/DL (ref 3.5–5.2)
ANION GAP SERPL CALCULATED.3IONS-SCNC: 12.7 MMOL/L (ref 5–15)
BUN SERPL-MCNC: 16 MG/DL (ref 8–23)
BUN/CREAT SERPL: 15.2 (ref 7–25)
CALCIUM SPEC-SCNC: 9.5 MG/DL (ref 8.6–10.5)
CHLORIDE SERPL-SCNC: 108 MMOL/L (ref 98–107)
CO2 SERPL-SCNC: 19.3 MMOL/L (ref 22–29)
CREAT SERPL-MCNC: 1.05 MG/DL (ref 0.57–1)
DEPRECATED RDW RBC AUTO: 52.4 FL (ref 37–54)
ERYTHROCYTE [DISTWIDTH] IN BLOOD BY AUTOMATED COUNT: 15.3 % (ref 12.3–15.4)
GFR SERPL CREATININE-BSD FRML MDRD: 52 ML/MIN/1.73
GLUCOSE SERPL-MCNC: 133 MG/DL (ref 65–99)
HCT VFR BLD AUTO: 39.9 % (ref 34–46.6)
HGB BLD-MCNC: 12.8 G/DL (ref 12–15.9)
MCH RBC QN AUTO: 30.1 PG (ref 26.6–33)
MCHC RBC AUTO-ENTMCNC: 32.1 G/DL (ref 31.5–35.7)
MCV RBC AUTO: 93.9 FL (ref 79–97)
PHOSPHATE SERPL-MCNC: 3.2 MG/DL (ref 2.5–4.5)
PLATELET # BLD AUTO: 306 10*3/MM3 (ref 140–450)
PMV BLD AUTO: 9.3 FL (ref 6–12)
POTASSIUM SERPL-SCNC: 4.4 MMOL/L (ref 3.5–5.2)
RBC # BLD AUTO: 4.25 10*6/MM3 (ref 3.77–5.28)
SODIUM SERPL-SCNC: 140 MMOL/L (ref 136–145)
URATE SERPL-MCNC: 5.4 MG/DL (ref 2.4–5.7)
WBC NRBC COR # BLD: 7.48 10*3/MM3 (ref 3.4–10.8)

## 2021-12-01 PROCEDURE — 82306 VITAMIN D 25 HYDROXY: CPT

## 2021-12-01 PROCEDURE — 83970 ASSAY OF PARATHORMONE: CPT

## 2021-12-01 PROCEDURE — 80069 RENAL FUNCTION PANEL: CPT

## 2021-12-01 PROCEDURE — 85027 COMPLETE CBC AUTOMATED: CPT

## 2021-12-01 PROCEDURE — 84550 ASSAY OF BLOOD/URIC ACID: CPT

## 2021-12-01 PROCEDURE — 36415 COLL VENOUS BLD VENIPUNCTURE: CPT

## 2021-12-02 LAB
25(OH)D3 SERPL-MCNC: 37.1 NG/ML
PTH-INTACT SERPL-MCNC: 25.1 PG/ML (ref 15–65)

## 2022-09-12 ENCOUNTER — HOSPITAL ENCOUNTER (OUTPATIENT)
Facility: HOSPITAL | Age: 72
LOS: 1 days | Discharge: HOME | End: 2022-09-13
Attending: EMERGENCY MEDICINE | Admitting: INTERNAL MEDICINE

## 2022-09-12 ENCOUNTER — APPOINTMENT (OUTPATIENT)
Dept: GENERAL RADIOLOGY | Facility: HOSPITAL | Age: 72
End: 2022-09-12

## 2022-09-12 ENCOUNTER — APPOINTMENT (OUTPATIENT)
Dept: CARDIOLOGY | Facility: HOSPITAL | Age: 72
End: 2022-09-12

## 2022-09-12 DIAGNOSIS — I21.3 ST ELEVATION MYOCARDIAL INFARCTION (STEMI), UNSPECIFIED ARTERY: Primary | ICD-10-CM

## 2022-09-12 PROBLEM — I20.0 UNSTABLE ANGINA: Status: ACTIVE | Noted: 2022-09-12

## 2022-09-12 LAB
ALBUMIN SERPL-MCNC: 4.3 G/DL (ref 3.5–5.2)
ALBUMIN/GLOB SERPL: 1.5 G/DL
ALP SERPL-CCNC: 75 U/L (ref 39–117)
ALT SERPL W P-5'-P-CCNC: 23 U/L (ref 1–33)
ANION GAP SERPL CALCULATED.3IONS-SCNC: 13 MMOL/L (ref 5–15)
AST SERPL-CCNC: 23 U/L (ref 1–32)
BASOPHILS # BLD AUTO: 0.04 10*3/MM3 (ref 0–0.2)
BASOPHILS NFR BLD AUTO: 0.3 % (ref 0–1.5)
BH CV ECHO MEAS - AO MAX PG: 4.5 MMHG
BH CV ECHO MEAS - AO MEAN PG: 2 MMHG
BH CV ECHO MEAS - AO ROOT DIAM: 2.6 CM
BH CV ECHO MEAS - AO V2 MAX: 106 CM/SEC
BH CV ECHO MEAS - AO V2 VTI: 25 CM
BH CV ECHO MEAS - AVA(I,D): 1.68 CM2
BH CV ECHO MEAS - EDV(CUBED): 81.2 ML
BH CV ECHO MEAS - EDV(MOD-SP2): 79.4 ML
BH CV ECHO MEAS - EDV(MOD-SP4): 82.8 ML
BH CV ECHO MEAS - EF(MOD-BP): 62.9 %
BH CV ECHO MEAS - EF(MOD-SP2): 63.4 %
BH CV ECHO MEAS - EF(MOD-SP4): 58.1 %
BH CV ECHO MEAS - ESV(CUBED): 22.7 ML
BH CV ECHO MEAS - ESV(MOD-SP2): 29.1 ML
BH CV ECHO MEAS - ESV(MOD-SP4): 34.7 ML
BH CV ECHO MEAS - FS: 34.6 %
BH CV ECHO MEAS - IVS/LVPW: 0.87 CM
BH CV ECHO MEAS - IVSD: 0.9 CM
BH CV ECHO MEAS - LA DIMENSION: 3.3 CM
BH CV ECHO MEAS - LAT PEAK E' VEL: 4.3 CM/SEC
BH CV ECHO MEAS - LV DIASTOLIC VOL/BSA (35-75): 50.9 CM2
BH CV ECHO MEAS - LV MASS(C)D: 137.2 GRAMS
BH CV ECHO MEAS - LV MAX PG: 2.9 MMHG
BH CV ECHO MEAS - LV MEAN PG: 1 MMHG
BH CV ECHO MEAS - LV SYSTOLIC VOL/BSA (12-30): 21.3 CM2
BH CV ECHO MEAS - LV V1 MAX: 85.7 CM/SEC
BH CV ECHO MEAS - LV V1 VTI: 20.1 CM
BH CV ECHO MEAS - LVIDD: 4.3 CM
BH CV ECHO MEAS - LVIDS: 2.8 CM
BH CV ECHO MEAS - LVOT AREA: 2.09 CM2
BH CV ECHO MEAS - LVOT DIAM: 1.63 CM
BH CV ECHO MEAS - LVPWD: 1.03 CM
BH CV ECHO MEAS - MED PEAK E' VEL: 5.6 CM/SEC
BH CV ECHO MEAS - MV A MAX VEL: 86.8 CM/SEC
BH CV ECHO MEAS - MV DEC TIME: 0.09 MSEC
BH CV ECHO MEAS - MV E MAX VEL: 50 CM/SEC
BH CV ECHO MEAS - MV E/A: 0.58
BH CV ECHO MEAS - MV MAX PG: 4.7 MMHG
BH CV ECHO MEAS - MV MEAN PG: 2 MMHG
BH CV ECHO MEAS - MV V2 VTI: 17.5 CM
BH CV ECHO MEAS - MVA(VTI): 2.4 CM2
BH CV ECHO MEAS - PA ACC TIME: 0.07 SEC
BH CV ECHO MEAS - PA PR(ACCEL): 47.5 MMHG
BH CV ECHO MEAS - PA V2 MAX: 126 CM/SEC
BH CV ECHO MEAS - RAP SYSTOLE: 3 MMHG
BH CV ECHO MEAS - RVSP: 25.8 MMHG
BH CV ECHO MEAS - SI(MOD-SP2): 30.9 ML/M2
BH CV ECHO MEAS - SI(MOD-SP4): 29.5 ML/M2
BH CV ECHO MEAS - SV(LVOT): 41.9 ML
BH CV ECHO MEAS - SV(MOD-SP2): 50.3 ML
BH CV ECHO MEAS - SV(MOD-SP4): 48.1 ML
BH CV ECHO MEAS - TAPSE (>1.6): 2.5 CM
BH CV ECHO MEAS - TR MAX PG: 22.8 MMHG
BH CV ECHO MEAS - TR MAX VEL: 239 CM/SEC
BH CV ECHO MEASUREMENTS AVERAGE E/E' RATIO: 10.1
BH CV XLRA - RV BASE: 2.9 CM
BH CV XLRA - TDI S': 8.2 CM/SEC
BILIRUB SERPL-MCNC: 0.3 MG/DL (ref 0–1.2)
BUN SERPL-MCNC: 16 MG/DL (ref 8–23)
BUN/CREAT SERPL: 16.2 (ref 7–25)
CALCIUM SPEC-SCNC: 10 MG/DL (ref 8.6–10.5)
CHLORIDE SERPL-SCNC: 107 MMOL/L (ref 98–107)
CO2 SERPL-SCNC: 20 MMOL/L (ref 22–29)
CREAT SERPL-MCNC: 0.99 MG/DL (ref 0.57–1)
D DIMER PPP FEU-MCNC: 0.43 MCGFEU/ML (ref 0–0.57)
DEPRECATED RDW RBC AUTO: 50.6 FL (ref 37–54)
EGFRCR SERPLBLD CKD-EPI 2021: 60.7 ML/MIN/1.73
EOSINOPHIL # BLD AUTO: 0.19 10*3/MM3 (ref 0–0.4)
EOSINOPHIL NFR BLD AUTO: 1.5 % (ref 0.3–6.2)
ERYTHROCYTE [DISTWIDTH] IN BLOOD BY AUTOMATED COUNT: 14.7 % (ref 12.3–15.4)
GLOBULIN UR ELPH-MCNC: 2.8 GM/DL
GLUCOSE SERPL-MCNC: 84 MG/DL (ref 65–99)
HCT VFR BLD AUTO: 40.3 % (ref 34–46.6)
HGB BLD-MCNC: 13.8 G/DL (ref 12–15.9)
HOLD SPECIMEN: NORMAL
HOLD SPECIMEN: NORMAL
IMM GRANULOCYTES # BLD AUTO: 0.05 10*3/MM3 (ref 0–0.05)
IMM GRANULOCYTES NFR BLD AUTO: 0.4 % (ref 0–0.5)
LEFT ATRIUM VOLUME INDEX: 22 ML/M2
LV EF 2D ECHO EST: 64 %
LYMPHOCYTES # BLD AUTO: 4.63 10*3/MM3 (ref 0.7–3.1)
LYMPHOCYTES NFR BLD AUTO: 36.5 % (ref 19.6–45.3)
MAXIMAL PREDICTED HEART RATE: 148 BPM
MCH RBC QN AUTO: 31.9 PG (ref 26.6–33)
MCHC RBC AUTO-ENTMCNC: 34.2 G/DL (ref 31.5–35.7)
MCV RBC AUTO: 93.3 FL (ref 79–97)
MONOCYTES # BLD AUTO: 0.78 10*3/MM3 (ref 0.1–0.9)
MONOCYTES NFR BLD AUTO: 6.2 % (ref 5–12)
NEUTROPHILS NFR BLD AUTO: 55.1 % (ref 42.7–76)
NEUTROPHILS NFR BLD AUTO: 6.98 10*3/MM3 (ref 1.7–7)
NRBC BLD AUTO-RTO: 0 /100 WBC (ref 0–0.2)
PLATELET # BLD AUTO: 266 10*3/MM3 (ref 140–450)
PMV BLD AUTO: 9.6 FL (ref 6–12)
POTASSIUM SERPL-SCNC: 4.5 MMOL/L (ref 3.5–5.2)
PROT SERPL-MCNC: 7.1 G/DL (ref 6–8.5)
RBC # BLD AUTO: 4.32 10*6/MM3 (ref 3.77–5.28)
SODIUM SERPL-SCNC: 140 MMOL/L (ref 136–145)
STRESS TARGET HR: 126 BPM
TROPONIN T SERPL-MCNC: <0.01 NG/ML (ref 0–0.03)
WBC NRBC COR # BLD: 12.67 10*3/MM3 (ref 3.4–10.8)
WHOLE BLOOD HOLD COAG: NORMAL
WHOLE BLOOD HOLD SPECIMEN: NORMAL

## 2022-09-12 PROCEDURE — 71045 X-RAY EXAM CHEST 1 VIEW: CPT

## 2022-09-12 PROCEDURE — 85379 FIBRIN DEGRADATION QUANT: CPT | Performed by: INTERNAL MEDICINE

## 2022-09-12 PROCEDURE — 85025 COMPLETE CBC W/AUTO DIFF WBC: CPT | Performed by: EMERGENCY MEDICINE

## 2022-09-12 PROCEDURE — 99283 EMERGENCY DEPT VISIT LOW MDM: CPT

## 2022-09-12 PROCEDURE — 84484 ASSAY OF TROPONIN QUANT: CPT | Performed by: EMERGENCY MEDICINE

## 2022-09-12 PROCEDURE — C1769 GUIDE WIRE: HCPCS | Performed by: INTERNAL MEDICINE

## 2022-09-12 PROCEDURE — 93005 ELECTROCARDIOGRAM TRACING: CPT | Performed by: EMERGENCY MEDICINE

## 2022-09-12 PROCEDURE — 93454 CORONARY ARTERY ANGIO S&I: CPT | Performed by: INTERNAL MEDICINE

## 2022-09-12 PROCEDURE — 99203 OFFICE O/P NEW LOW 30 MIN: CPT | Performed by: INTERNAL MEDICINE

## 2022-09-12 PROCEDURE — 93306 TTE W/DOPPLER COMPLETE: CPT

## 2022-09-12 PROCEDURE — C1894 INTRO/SHEATH, NON-LASER: HCPCS | Performed by: INTERNAL MEDICINE

## 2022-09-12 PROCEDURE — 93306 TTE W/DOPPLER COMPLETE: CPT | Performed by: INTERNAL MEDICINE

## 2022-09-12 PROCEDURE — 99223 1ST HOSP IP/OBS HIGH 75: CPT | Performed by: INTERNAL MEDICINE

## 2022-09-12 PROCEDURE — 80053 COMPREHEN METABOLIC PANEL: CPT | Performed by: EMERGENCY MEDICINE

## 2022-09-12 PROCEDURE — 25010000002 MORPHINE PER 10 MG: Performed by: EMERGENCY MEDICINE

## 2022-09-12 PROCEDURE — 0 IOPAMIDOL PER 1 ML: Performed by: INTERNAL MEDICINE

## 2022-09-12 PROCEDURE — 63710000001 DIPHENHYDRAMINE PER 50 MG: Performed by: INTERNAL MEDICINE

## 2022-09-12 PROCEDURE — 0 LIDOCAINE 1 % SOLUTION: Performed by: INTERNAL MEDICINE

## 2022-09-12 PROCEDURE — 25010000002 HEPARIN (PORCINE) PER 1000 UNITS: Performed by: INTERNAL MEDICINE

## 2022-09-12 PROCEDURE — 96374 THER/PROPH/DIAG INJ IV PUSH: CPT

## 2022-09-12 RX ORDER — NALOXONE HCL 0.4 MG/ML
0.4 VIAL (ML) INJECTION
Status: DISCONTINUED | OUTPATIENT
Start: 2022-09-12 | End: 2022-09-13 | Stop reason: HOSPADM

## 2022-09-12 RX ORDER — ATORVASTATIN CALCIUM 20 MG/1
20 TABLET, FILM COATED ORAL EVERY EVENING
Status: DISCONTINUED | OUTPATIENT
Start: 2022-09-12 | End: 2022-09-13 | Stop reason: HOSPADM

## 2022-09-12 RX ORDER — EZETIMIBE 10 MG/1
10 TABLET ORAL NIGHTLY
Status: DISCONTINUED | OUTPATIENT
Start: 2022-09-12 | End: 2022-09-13 | Stop reason: HOSPADM

## 2022-09-12 RX ORDER — MORPHINE SULFATE 4 MG/ML
4 INJECTION, SOLUTION INTRAMUSCULAR; INTRAVENOUS ONCE
Status: COMPLETED | OUTPATIENT
Start: 2022-09-12 | End: 2022-09-12

## 2022-09-12 RX ORDER — FLUTICASONE PROPIONATE AND SALMETEROL 100; 50 UG/1; UG/1
POWDER RESPIRATORY (INHALATION)
COMMUNITY

## 2022-09-12 RX ORDER — TEMAZEPAM 15 MG/1
15 CAPSULE ORAL NIGHTLY PRN
Status: DISCONTINUED | OUTPATIENT
Start: 2022-09-12 | End: 2022-09-13 | Stop reason: HOSPADM

## 2022-09-12 RX ORDER — BISOPROLOL FUMARATE 5 MG/1
10 TABLET, FILM COATED ORAL DAILY
Status: DISCONTINUED | OUTPATIENT
Start: 2022-09-13 | End: 2022-09-13 | Stop reason: HOSPADM

## 2022-09-12 RX ORDER — ASPIRIN 325 MG
325 TABLET ORAL ONCE
Status: COMPLETED | OUTPATIENT
Start: 2022-09-12 | End: 2022-09-12

## 2022-09-12 RX ORDER — MECLIZINE HYDROCHLORIDE 25 MG/1
25 TABLET ORAL 3 TIMES DAILY PRN
Status: DISCONTINUED | OUTPATIENT
Start: 2022-09-12 | End: 2022-09-13 | Stop reason: HOSPADM

## 2022-09-12 RX ORDER — BACLOFEN 10 MG/1
10 TABLET ORAL NIGHTLY
COMMUNITY

## 2022-09-12 RX ORDER — ALPRAZOLAM 0.25 MG/1
0.25 TABLET ORAL 3 TIMES DAILY PRN
Status: DISCONTINUED | OUTPATIENT
Start: 2022-09-12 | End: 2022-09-13 | Stop reason: HOSPADM

## 2022-09-12 RX ORDER — NICOTINE 21 MG/24HR
1 PATCH, TRANSDERMAL 24 HOURS TRANSDERMAL
Status: DISCONTINUED | OUTPATIENT
Start: 2022-09-13 | End: 2022-09-13 | Stop reason: HOSPADM

## 2022-09-12 RX ORDER — BUSPIRONE HYDROCHLORIDE 15 MG/1
15 TABLET ORAL 3 TIMES DAILY
Status: DISCONTINUED | OUTPATIENT
Start: 2022-09-12 | End: 2022-09-13 | Stop reason: HOSPADM

## 2022-09-12 RX ORDER — ONDANSETRON 4 MG/1
4 TABLET, FILM COATED ORAL EVERY 6 HOURS PRN
Status: DISCONTINUED | OUTPATIENT
Start: 2022-09-12 | End: 2022-09-13 | Stop reason: HOSPADM

## 2022-09-12 RX ORDER — BUSPIRONE HYDROCHLORIDE 15 MG/1
15 TABLET ORAL 3 TIMES DAILY
COMMUNITY

## 2022-09-12 RX ORDER — ASPIRIN 81 MG/1
81 TABLET ORAL DAILY
Status: DISCONTINUED | OUTPATIENT
Start: 2022-09-13 | End: 2022-09-13 | Stop reason: HOSPADM

## 2022-09-12 RX ORDER — BISOPROLOL FUMARATE 5 MG/1
5 TABLET, FILM COATED ORAL DAILY
Status: DISCONTINUED | OUTPATIENT
Start: 2022-09-12 | End: 2022-09-12

## 2022-09-12 RX ORDER — MORPHINE SULFATE 4 MG/ML
4 INJECTION, SOLUTION INTRAMUSCULAR; INTRAVENOUS
Status: DISCONTINUED | OUTPATIENT
Start: 2022-09-12 | End: 2022-09-13 | Stop reason: HOSPADM

## 2022-09-12 RX ORDER — SODIUM CHLORIDE 9 MG/ML
100 INJECTION, SOLUTION INTRAVENOUS CONTINUOUS
Status: ACTIVE | OUTPATIENT
Start: 2022-09-12 | End: 2022-09-12

## 2022-09-12 RX ORDER — ALBUTEROL SULFATE 90 UG/1
2 AEROSOL, METERED RESPIRATORY (INHALATION) EVERY 4 HOURS PRN
COMMUNITY

## 2022-09-12 RX ORDER — ONDANSETRON 2 MG/ML
4 INJECTION INTRAMUSCULAR; INTRAVENOUS EVERY 6 HOURS PRN
Status: DISCONTINUED | OUTPATIENT
Start: 2022-09-12 | End: 2022-09-13 | Stop reason: HOSPADM

## 2022-09-12 RX ORDER — SODIUM CHLORIDE 0.9 % (FLUSH) 0.9 %
10 SYRINGE (ML) INJECTION AS NEEDED
Status: DISCONTINUED | OUTPATIENT
Start: 2022-09-12 | End: 2022-09-13 | Stop reason: HOSPADM

## 2022-09-12 RX ORDER — HYDROCODONE BITARTRATE AND ACETAMINOPHEN 5; 325 MG/1; MG/1
1 TABLET ORAL EVERY 4 HOURS PRN
Status: DISCONTINUED | OUTPATIENT
Start: 2022-09-12 | End: 2022-09-13 | Stop reason: HOSPADM

## 2022-09-12 RX ORDER — ACETAMINOPHEN 325 MG/1
650 TABLET ORAL EVERY 4 HOURS PRN
Status: DISCONTINUED | OUTPATIENT
Start: 2022-09-12 | End: 2022-09-13 | Stop reason: HOSPADM

## 2022-09-12 RX ORDER — DIPHENHYDRAMINE HCL 25 MG
25 CAPSULE ORAL EVERY 6 HOURS PRN
Status: DISCONTINUED | OUTPATIENT
Start: 2022-09-12 | End: 2022-09-13 | Stop reason: HOSPADM

## 2022-09-12 RX ORDER — PANTOPRAZOLE SODIUM 40 MG/1
40 TABLET, DELAYED RELEASE ORAL EVERY MORNING
Refills: 0 | Status: DISCONTINUED | OUTPATIENT
Start: 2022-09-13 | End: 2022-09-13 | Stop reason: HOSPADM

## 2022-09-12 RX ORDER — LIDOCAINE HYDROCHLORIDE 10 MG/ML
INJECTION, SOLUTION INFILTRATION; PERINEURAL AS NEEDED
Status: DISCONTINUED | OUTPATIENT
Start: 2022-09-12 | End: 2022-09-12 | Stop reason: HOSPADM

## 2022-09-12 RX ORDER — BACLOFEN 10 MG/1
10 TABLET ORAL NIGHTLY
Status: DISCONTINUED | OUTPATIENT
Start: 2022-09-12 | End: 2022-09-13 | Stop reason: HOSPADM

## 2022-09-12 RX ORDER — TRAZODONE HYDROCHLORIDE 50 MG/1
100 TABLET ORAL NIGHTLY PRN
Status: DISCONTINUED | OUTPATIENT
Start: 2022-09-12 | End: 2022-09-13 | Stop reason: HOSPADM

## 2022-09-12 RX ORDER — LANOLIN ALCOHOL/MO/W.PET/CERES
1000 CREAM (GRAM) TOPICAL DAILY
COMMUNITY

## 2022-09-12 RX ORDER — DIPHENHYDRAMINE HCL 25 MG
25 TABLET ORAL EVERY 6 HOURS PRN
Status: DISCONTINUED | OUTPATIENT
Start: 2022-09-12 | End: 2022-09-12 | Stop reason: ALTCHOICE

## 2022-09-12 RX ADMIN — BUSPIRONE HYDROCHLORIDE 15 MG: 15 TABLET ORAL at 22:35

## 2022-09-12 RX ADMIN — ASPIRIN 325 MG ORAL TABLET 325 MG: 325 PILL ORAL at 16:59

## 2022-09-12 RX ADMIN — DIPHENHYDRAMINE HYDROCHLORIDE 25 MG: 25 CAPSULE ORAL at 22:35

## 2022-09-12 RX ADMIN — MORPHINE SULFATE 4 MG: 4 INJECTION, SOLUTION INTRAMUSCULAR; INTRAVENOUS at 16:59

## 2022-09-12 RX ADMIN — TRAZODONE HYDROCHLORIDE 100 MG: 50 TABLET ORAL at 22:35

## 2022-09-12 RX ADMIN — ACETAMINOPHEN 650 MG: 325 TABLET, FILM COATED ORAL at 22:35

## 2022-09-12 RX ADMIN — SODIUM CHLORIDE 100 ML/HR: 9 INJECTION, SOLUTION INTRAVENOUS at 19:48

## 2022-09-12 RX ADMIN — BACLOFEN 10 MG: 10 TABLET ORAL at 22:35

## 2022-09-12 NOTE — ED PROVIDER NOTES
Subjective   PIT    Chief Complaint: Substernal squeezing chest pressure  History of Present Illness: 71-year-old female with a history of pacemaker implantation for bradycardia, non-insulin-dependent diabetes hyperlipidemia and hypertension comes in for evaluation of above complaint.  1 hour prior to arrival while driving in the car had acute onset of substernal squeezing chest pressure that did not radiate.  Chronic shortness of breath none worse than baseline.  No diaphoresis or nausea.  No history of CAD per her report.  Took an aspirin full-strength and 1 nitro prior to arrival pain went from a 10 out of 10 to an 8 out of 10.  Onset: 1 hour prior to arrival  Timing: Ongoing but slightly improved  Exacerbating / Alleviating factors: None  Associated symptoms: None      Nurses Notes reviewed and agree, including vitals, allergies, social history and prior medical history.          Review of Systems   Constitutional: Negative.    HENT: Negative.    Eyes: Negative.    Respiratory: Negative.    Cardiovascular: Positive for chest pain.   Gastrointestinal: Negative.    Genitourinary: Negative.    Musculoskeletal: Negative.    Skin: Negative.    Neurological: Negative.    Psychiatric/Behavioral: Negative.        Past Medical History:   Diagnosis Date   • Arthritis    • Back pain    • Back pain    • Colon polyp 02/24/2016   • Colon polyps 11/21/2019   • Depression 1998   • Diabetes mellitus (HCC) 2002    Type 2   • Dizziness    • GERD (gastroesophageal reflux disease)    • High triglycerides 1998   • History of cataract    • History of kidney stones    • History of stroke    • Hyperlipidemia 1998   • Hypertension    • Kidney failure 2018   • Osteoarthritis    • PONV (postoperative nausea and vomiting)    • PTSD (post-traumatic stress disorder) 1998   • Shortness of breath        Allergies   Allergen Reactions   • Diclofenac Unknown (See Comments)     Causing patient kidney failure    • Penicillins Hives   • Wheat Bran  Unknown (See Comments)     ADDITIONAL FOOD ALLERGIES PER PATIENT: cabbage, wheat, cucumbers, white potatoes   • Augmentin [Amoxicillin-Pot Clavulanate] Rash       Past Surgical History:   Procedure Laterality Date   • APPENDECTOMY  1979   • CARDIAC PACEMAKER PLACEMENT  04/01/2011    [facility]   • CARPAL TUNNEL RELEASE     • CATARACT EXTRACTION     • CHOLECYSTECTOMY  2000   • COLONOSCOPY  02/24/2016   • COLONOSCOPY N/A 11/21/2019    Procedure: COLONOSCOPY WITH COLD BIOPSY POLYPECTOMY AND BIOPSIES;  Surgeon: Qamar Baird MD;  Location: [facility] ENDOSCOPY;  Service: Gastroenterology   • CYST REMOVAL     • EYE SURGERY Bilateral 2006    Cataract    • HEMORRHOIDECTOMY  2002   • HERNIA REPAIR     • HERNIA REPAIR Right 2009   • HYSTERECTOMY     • HYSTERECTOMY  1979   • KNEE SURGERY Bilateral     1991 and 2003   • PACEMAKER IMPLANTATION  2011   • UPPER GASTROINTESTINAL ENDOSCOPY  03/02/2016       Family History   Problem Relation Age of Onset   • No Known Problems Mother    • Colon cancer Father    • Stomach cancer Paternal Aunt    • Ovarian cancer Paternal Aunt        Social History     Socioeconomic History   • Marital status:    Tobacco Use   • Smoking status: Current Every Day Smoker     Packs/day: 0.50     Types: Cigarettes   • Smokeless tobacco: Never Used   Substance and Sexual Activity   • Alcohol use: No     Comment: HX: 2006   • Drug use: No   • Sexual activity: Defer           Objective   Physical Exam  Vitals and nursing note reviewed.   Constitutional:       General: She is not in acute distress.     Appearance: She is well-developed and normal weight. She is not ill-appearing, toxic-appearing or diaphoretic.   HENT:      Head: Normocephalic and atraumatic.   Cardiovascular:      Rate and Rhythm: Normal rate and regular rhythm.      Heart sounds: Normal heart sounds. No murmur heard.  Pulmonary:      Breath sounds: Normal breath sounds.   Abdominal:      Palpations: Abdomen is soft.   Musculoskeletal:          General: Normal range of motion.      Cervical back: Normal range of motion.   Neurological:      General: No focal deficit present.      Mental Status: She is alert.   Psychiatric:         Mood and Affect: Mood normal.         Behavior: Behavior normal.         Procedures           ED Course  ED Course as of 09/12/22 1701   Mon Sep 12, 2022   1655 EKG interpretation 16:49.  EKG interpreted by me reveals a paced rhythm rate of 69.  There is ST elevation in lead V3, aVR.  Subtle ST depression in lead V6. concern for ischemia.  Discussed with Dr. Ibrahim, he reviewed EKG, recommended activation of Cath Lab. [PF]      ED Course User Index  [PF] Carlos Yao, DO                                           MDM  Dr. Ibarhim contacted by Dr. Yao, Cath Lab for STEMI.    30 minutes of critical care provided. This time excludes other billable procedures. Time does include preparation of documents, medical consultations, review of old records, and direct bedside care. Patient is at high risk for life-threatening deterioration due to STEMI.   Final diagnoses:   ST elevation myocardial infarction (STEMI), unspecified artery (HCC)       ED Disposition  ED Disposition     ED Disposition   Send to Cath Lab    Condition   --    Comment   --             No follow-up provider specified.       Medication List      No changes were made to your prescriptions during this visit.          Linwood Ellington PA-C  09/12/22 1658       Linwood Ellington PA-C  09/12/22 1701       Linwood Ellington PA-C  09/12/22 1820

## 2022-09-13 ENCOUNTER — READMISSION MANAGEMENT (OUTPATIENT)
Dept: CALL CENTER | Facility: HOSPITAL | Age: 72
End: 2022-09-13

## 2022-09-13 VITALS
BODY MASS INDEX: 26.89 KG/M2 | RESPIRATION RATE: 20 BRPM | OXYGEN SATURATION: 93 % | DIASTOLIC BLOOD PRESSURE: 57 MMHG | HEIGHT: 61 IN | SYSTOLIC BLOOD PRESSURE: 122 MMHG | HEART RATE: 71 BPM | WEIGHT: 142.42 LBS | TEMPERATURE: 98.1 F

## 2022-09-13 PROBLEM — R07.89 NON-CARDIAC CHEST PAIN: Status: ACTIVE | Noted: 2022-09-13

## 2022-09-13 PROBLEM — E78.5 DYSLIPIDEMIA: Status: ACTIVE | Noted: 2022-09-13

## 2022-09-13 PROBLEM — I10 ESSENTIAL HYPERTENSION: Status: ACTIVE | Noted: 2022-09-13

## 2022-09-13 PROBLEM — F17.210 TOBACCO DEPENDENCE DUE TO CIGARETTES: Status: ACTIVE | Noted: 2022-09-13

## 2022-09-13 PROBLEM — I21.3 ST ELEVATION MYOCARDIAL INFARCTION (STEMI), UNSPECIFIED ARTERY (HCC): Status: ACTIVE | Noted: 2022-09-13

## 2022-09-13 LAB
ANION GAP SERPL CALCULATED.3IONS-SCNC: 10.5 MMOL/L (ref 5–15)
BUN SERPL-MCNC: 13 MG/DL (ref 8–23)
BUN/CREAT SERPL: 13.8 (ref 7–25)
CALCIUM SPEC-SCNC: 8.8 MG/DL (ref 8.6–10.5)
CHLORIDE SERPL-SCNC: 114 MMOL/L (ref 98–107)
CHOLEST SERPL-MCNC: 101 MG/DL (ref 0–200)
CO2 SERPL-SCNC: 20.5 MMOL/L (ref 22–29)
CREAT SERPL-MCNC: 0.94 MG/DL (ref 0.57–1)
DEPRECATED RDW RBC AUTO: 51.6 FL (ref 37–54)
EGFRCR SERPLBLD CKD-EPI 2021: 64.6 ML/MIN/1.73
ERYTHROCYTE [DISTWIDTH] IN BLOOD BY AUTOMATED COUNT: 14.6 % (ref 12.3–15.4)
GLUCOSE BLDC GLUCOMTR-MCNC: 115 MG/DL (ref 70–130)
GLUCOSE SERPL-MCNC: 93 MG/DL (ref 65–99)
HBA1C MFR BLD: 6.1 % (ref 4.8–5.6)
HCT VFR BLD AUTO: 37.7 % (ref 34–46.6)
HDLC SERPL-MCNC: 32 MG/DL (ref 40–60)
HGB BLD-MCNC: 12.4 G/DL (ref 12–15.9)
LDLC SERPL CALC-MCNC: 39 MG/DL (ref 0–100)
LDLC/HDLC SERPL: 1.03 {RATIO}
MCH RBC QN AUTO: 31.3 PG (ref 26.6–33)
MCHC RBC AUTO-ENTMCNC: 32.9 G/DL (ref 31.5–35.7)
MCV RBC AUTO: 95.2 FL (ref 79–97)
PLATELET # BLD AUTO: 234 10*3/MM3 (ref 140–450)
PMV BLD AUTO: 9.7 FL (ref 6–12)
POTASSIUM SERPL-SCNC: 4.2 MMOL/L (ref 3.5–5.2)
RBC # BLD AUTO: 3.96 10*6/MM3 (ref 3.77–5.28)
SODIUM SERPL-SCNC: 145 MMOL/L (ref 136–145)
TRIGL SERPL-MCNC: 181 MG/DL (ref 0–150)
TROPONIN T SERPL-MCNC: <0.01 NG/ML (ref 0–0.03)
VLDLC SERPL-MCNC: 30 MG/DL (ref 5–40)
WBC NRBC COR # BLD: 10.1 10*3/MM3 (ref 3.4–10.8)

## 2022-09-13 PROCEDURE — 85027 COMPLETE CBC AUTOMATED: CPT | Performed by: INTERNAL MEDICINE

## 2022-09-13 PROCEDURE — 94618 PULMONARY STRESS TESTING: CPT

## 2022-09-13 PROCEDURE — 99213 OFFICE O/P EST LOW 20 MIN: CPT | Performed by: INTERNAL MEDICINE

## 2022-09-13 PROCEDURE — 82962 GLUCOSE BLOOD TEST: CPT

## 2022-09-13 PROCEDURE — 63710000001 DIPHENHYDRAMINE PER 50 MG: Performed by: INTERNAL MEDICINE

## 2022-09-13 PROCEDURE — G0378 HOSPITAL OBSERVATION PER HR: HCPCS

## 2022-09-13 PROCEDURE — 80048 BASIC METABOLIC PNL TOTAL CA: CPT | Performed by: INTERNAL MEDICINE

## 2022-09-13 PROCEDURE — 83036 HEMOGLOBIN GLYCOSYLATED A1C: CPT | Performed by: INTERNAL MEDICINE

## 2022-09-13 PROCEDURE — 80061 LIPID PANEL: CPT | Performed by: INTERNAL MEDICINE

## 2022-09-13 PROCEDURE — 84484 ASSAY OF TROPONIN QUANT: CPT | Performed by: INTERNAL MEDICINE

## 2022-09-13 RX ADMIN — BUSPIRONE HYDROCHLORIDE 15 MG: 15 TABLET ORAL at 08:16

## 2022-09-13 RX ADMIN — SERTRALINE 100 MG: 50 TABLET, FILM COATED ORAL at 08:16

## 2022-09-13 RX ADMIN — DIPHENHYDRAMINE HYDROCHLORIDE 25 MG: 25 CAPSULE ORAL at 08:16

## 2022-09-13 RX ADMIN — PANTOPRAZOLE SODIUM 40 MG: 40 TABLET, DELAYED RELEASE ORAL at 06:08

## 2022-09-13 RX ADMIN — ASPIRIN 81 MG: 81 TABLET, COATED ORAL at 08:16

## 2022-09-13 RX ADMIN — BISOPROLOL FUMARATE 10 MG: 5 TABLET ORAL at 08:16

## 2022-09-13 RX ADMIN — Medication 1 PATCH: at 08:17

## 2022-09-13 NOTE — PLAN OF CARE
Goal Outcome Evaluation:           Progress: improving  Outcome Evaluation: Patient did well overnight. Vitals have been stable and will continue to monitor. No overnight events noted. Possible discharge today.

## 2022-09-13 NOTE — DISCHARGE SUMMARY
"    [facility]   DISCHARGE SUMMARY      Name:  Lucy Pedro   Age:  71 y.o.  Sex:  female  :  1950  MRN:  [id number]   Visit Number:  [id number]    Admission Date:  2022  Date of Discharge:  2022  Primary Care Physician:  Debbi Yu MD    Important issues to note:    1.  Patient was admitted for acute chest pain with nonspecific EKG changes.  Patient was taken to the Cath Lab by Dr. Ibrahim and did undergo diagnostic cardiac catheterization which revealed near normal coronaries.  Patient serial troponins remained within normal limits.  Patient's chest pain was thought to be musculoskeletal in nature.  No change has been made to her home medication regimen.  2.  Patient has been strongly advised to discontinue smoking.  3.  Follow-up with primary care provider in 1 week.    Discharge Diagnoses:     1.  Noncardiac musculoskeletal chest pain, POA, resolved.  2.  Essential hypertension.  3.  Sick sinus syndrome status post pacemaker.  4.  Dyslipidemia.  5.  Tobacco dependence.    Problem List:     Active Hospital Problems    Diagnosis  POA   • **Non-cardiac chest pain [R07.89]  Yes   • Essential hypertension [I10]  Yes   • Dyslipidemia [E78.5]  Yes   • Tobacco dependence due to cigarettes [F17.210]  Yes      Resolved Hospital Problems   No resolved problems to display.     Presenting Problem:    Chief Complaint   Patient presents with   • Chest Pain      Consults:     Internal medicine: Dr. Carlos    Procedures Performed:    Diagnostic Left Heart Cath on 2022    History of presenting illness/Hospital Course:    Ms. Pedro is a 71-year-old female history significant for hypertension, hyperlipidemia, and tobacco abuse who was admitted from the emergency room with acute onset bilateral chest pressure that was described as a \"squeezing\".    Patient chest pain improved with nitroglycerin.  Patient has history of sick sinus syndrome and has a " pacemaker.  Her initial EKG showed J-point elevation in leads V1 and V3 along with downsloping ST segment depression in 1 and aVL suggestive of possible LVH with strain versus reciprocal changes.  Patient was initially admitted by Dr. Ibrahim from cardiology and was taken to the cardiac Cath Lab from the emergency room.  She did undergo cardiac catheterization through right radial approach on 9/12/2022 and was noted to have near normal coronaries.  Dr. Ibrahim felt that the patient's chest pain was noncardiac in nature.  She was admitted overnight to telemetry for observation.  Her repeat troponin next day morning was normal as well.  She currently does not have any significant chest pain.  She was noted to have local precordial tenderness which is minimal.  She does not have any pleuritic type of chest pain at this time.  A 2D echocardiogram done on 9/12/2022 showed normal left ventricular ejection fraction at 64%.  Grade 1 diastolic dysfunction noted.    Patient is currently feeling better and will be discharged home.  She has been strongly advised to discontinue smoking.  She already takes statins and low-dose aspirin at home which will be continued along with her bisoprolol.  She is advised to follow-up with her primary care physician in 1 week.  She states that she lives with her  and is independent in daily activities.  No further home needs at this time.  I have discussed the patient's discharge plan with Dr. Ibrahim over the phone.    Vital Signs:    Temp:  [97.7 °F (36.5 °C)-98.6 °F (37 °C)] 97.7 °F (36.5 °C)  Heart Rate:  [69-73] 73  Resp:  [16-20] 20  BP: (114-166)/(51-82) 140/58    Physical Exam:    General Appearance:  Alert and cooperative.   Head:  Atraumatic and normocephalic.   Eyes: Conjunctivae and sclerae normal, no icterus. No pallor.   Ears:  Ears with no abnormalities noted.   Throat: No oral lesions, no thrush, oral mucosa moist.   Neck: Supple, trachea midline, no thyromegaly.    Back:   No kyphoscoliosis present. No tenderness to palpation.   Lungs:   Breath sounds heard bilaterally equally.  No crackles or wheezing. No Pleural rub or bronchial breathing.   Heart:  Normal S1 and S2, no murmur, no gallop, no rub. No JVD.  Pacemaker is palpated on the right upper chest.   Abdomen:   Normal bowel sounds, no masses, no organomegaly. Soft, nontender, nondistended, no rebound tenderness.   Extremities: Supple, no edema, no cyanosis, no clubbing.  Right radial puncture site looks clean without any hematoma.   Pulses: Pulses palpable bilaterally.   Skin: No bleeding or rash.   Neurologic: Alert and oriented x 3. No facial asymmetry. Moves all four limbs. No tremors.     Pertinent Lab Results:     Results from last 7 days   Lab Units 09/13/22  0617 09/12/22  1650   SODIUM mmol/L 145 140   POTASSIUM mmol/L 4.2 4.5   CHLORIDE mmol/L 114* 107   CO2 mmol/L 20.5* 20.0*   BUN mg/dL 13 16   CREATININE mg/dL 0.94 0.99   CALCIUM mg/dL 8.8 10.0   BILIRUBIN mg/dL  --  0.3   ALK PHOS U/L  --  75   ALT (SGPT) U/L  --  23   AST (SGOT) U/L  --  23   GLUCOSE mg/dL 93 84     Results from last 7 days   Lab Units 09/13/22  0617 09/12/22  1650   WBC 10*3/mm3 10.10 12.67*   HEMOGLOBIN g/dL 12.4 13.8   HEMATOCRIT % 37.7 40.3   PLATELETS 10*3/mm3 234 266         Results from last 7 days   Lab Units 09/13/22  0617 09/12/22  1650   TROPONIN T ng/mL <0.010 <0.010     Pertinent Radiology Results:    Imaging Results (All)     Procedure Component Value Units Date/Time    XR Chest 1 View [[id number]] Collected: 09/12/22 2303     Updated: 09/12/22 2305    Narrative:      FINAL REPORT    TECHNIQUE:  An AP portable view of the chest was obtained.    CLINICAL HISTORY:  post cath    FINDINGS:  An intravenous pacemaker is appropriately positioned.  The heart  is normal in size.  The lungs are clear.  There is no pleural  disease.  There is no acute osseous abnormality.      Impression:      No acute abnormality.    Authenticated and  Electronically Signed by Jaden Hernandez M.D. on  09/12/2022 11:03:07 PM        Echo:    Results for orders placed during the hospital encounter of 09/12/22    Adult Transthoracic Echo Complete W/ Cont if Necessary Per Protocol    Interpretation Summary  · Estimated left ventricular EF = 64% Left ventricular ejection fraction appears to be 61 - 65%. Left ventricular systolic function is normal.  · Left ventricular diastolic function is consistent with (grade I) impaired relaxation.  · Estimated right ventricular systolic pressure from tricuspid regurgitation is normal (<35 mmHg).    Condition on Discharge:      Stable.    Code status during the hospital stay:    Code Status and Medical Interventions:   Ordered at: 09/13/22 0849     Code Status (Patient has no pulse and is not breathing):    CPR (Attempt to Resuscitate)     Medical Interventions (Patient has pulse or is breathing):    Full Support     Release to patient:    Routine Release     Discharge Disposition:    Home or Self Care    Discharge Medications:       Discharge Medications      Continue These Medications      Instructions Start Date   albuterol sulfate  (90 Base) MCG/ACT inhaler  Commonly known as: PROVENTIL HFA;VENTOLIN HFA;PROAIR HFA   2 puffs, Inhalation, Every 4 Hours PRN      aspirin 81 MG EC tablet   81 mg, Oral, Nightly      atorvastatin 20 MG tablet  Commonly known as: LIPITOR   80 mg, Oral, Every Evening      baclofen 10 MG tablet  Commonly known as: LIORESAL   10 mg, Oral, Nightly      BENADRYL ALLERGY PO   1 tablet, Oral, 2 Times Daily      bisoprolol 5 MG tablet  Commonly known as: ZEBeta   TAKE 1 TABLET EVERY DAY FOR BLOOD PRESSURE      busPIRone 15 MG tablet  Commonly known as: BUSPAR   15 mg, Oral, 3 Times Daily      ezetimibe 10 MG tablet  Commonly known as: ZETIA   10 mg, Oral, Daily      Fluticasone-Salmeterol 100-50 MCG/ACT DISKUS  Commonly known as: ADVAIR/WIXELA   Inhalation, 2 Times Daily - RT      meclizine 25 MG chewable  tablet chewable tablet   25 mg, Oral, 3 Times Daily PRN      metFORMIN 850 MG tablet  Commonly known as: GLUCOPHAGE   500 mg.      omeprazole 40 MG capsule  Commonly known as: priLOSEC   TAKE 1 CAPSULE BY MOUTH DAILY AS NEEDED FOR HEART BURN      sertraline 100 MG tablet  Commonly known as: ZOLOFT   TAKE 1 TABLET EVERY DAY FOR MOOD AND OR ANXIETY      traZODone 100 MG tablet  Commonly known as: DESYREL   TAKE 1 TABLET EVERY NIGHT AT BEDTIME FOR SLEEP      True Metrix Blood Glucose Test test strip  Generic drug: glucose blood   USE TO CHECK BLOOD SUGAR ONCE A DAY      TRUEplus Lancets 30G misc   USE TO TEST ONCE DAILY AS DIRECTED      vitamin B-12 1000 MCG tablet  Commonly known as: CYANOCOBALAMIN   1,000 mcg, Oral, Daily           Discharge Diet:     Diet Instructions     Diet: Cardiac; Thin      Discharge Diet: Cardiac    Fluid Consistency: Thin        Activity at Discharge:     Activity Instructions     Activity as Tolerated          Follow-up Appointments:     Follow-up Information     Debbi Yu MD Follow up in 1 week(s).    Specialty: Family Medicine  Contact information:  [address] [place] [address] [phone]                       Test Results Pending at Discharge:    None.       Justin Ovalles MD  09/13/22  08:49 EDT    Time: I spent 20 minutes on this discharge activity which included: face-to-face encounter with the patient, reviewing the data in the system, coordination of the care with the nursing staff as well as consultants, documentation, and entering orders.     Dictated utilizing Dragon dictation.

## 2022-09-13 NOTE — PLAN OF CARE
Goal Outcome Evaluation:         Patient here from cath lab, no intervention. 13ml in right wrist TR band. Site clean and dry. No bruising or swelling noted.

## 2022-09-13 NOTE — PAYOR COMM NOTE
"TO:BC  FROM:MCKENNA SOLOMON, RN PHONE [phone]  IN CLINICALS REF# [id number]    Lucy Pedro (71 y.o. Female)             Date of Birth   1950    Social Security Number       Address   [address] [place] [address]    Home Phone   [phone]    MRN   [id number]       Pentec[employer]al   Christianity    Marital Status                               Admission Date   9/12/22    Admission Type   Emergency    Admitting Provider   Dionicio Ibrahim MD    Attending Provider   Justin Ovalles MD    Department, Room/Bed   [facility] MED SURG  3, 304/1       Discharge Date       Discharge Disposition   Home or Self Care    Discharge Destination                               Attending Provider: Justin Ovalles MD    Allergies: Diclofenac, Penicillins, Wheat Bran, Augmentin [Amoxicillin-pot Clavulanate]    Isolation: None   Infection: None   Code Status: CPR   Advance Care Planning Activity    Ht: 154.9 cm (60.98\")   Wt: 64.6 kg (142 lb 6.7 oz)    Admission Cmt: None   Principal Problem: Non-cardiac chest pain [R07.89]                 Active Insurance as of 9/12/2022     Primary Coverage     Payor Plan Insurance Group Employer/Plan Group    ANTHEM MEDICARE REPLACEMENT ANTHEM MEDICARE ADVANTAGE [id number]     Payor Plan Address Payor Plan Phone Number Payor Plan Fax Number Effective Dates    [address] [phone]  4/1/2022 - None Entered    [facility] [id number]       Subscriber Name Subscriber Birth Date Member ID       LUCY PEDRO 1950 [id number]           Secondary Coverage     Payor Plan Insurance Group Employer/Plan Group    [employer] [place] MEDICAID      Payor Plan Address Payor Plan Phone Number Payor Plan Fax Number Effective Dates    [address] [phone]  3/27/2019 - None Entered    [facility] [id number]       Subscriber Name Subscriber Birth Date Member ID       LUCY PEDRO 1950 [id number]                 Emergency " "Contacts      (Rel.) Home Phone Work Phone Mobile Phone    caty cleary (Spouse) -- -- [phone]               History & Physical      Breeding, Dionicio WEI MD at 09/12/22 1711                Patient Care Team:  Debbi Yu MD as PCP - General (Family Medicine)  Torsten Collins MD as Consulting Physician (General Surgery)    Chief complaint : Chest pain    Subjective     Patient is a 71 y.o. female presents with acute onset of chest discomfort.  The patient reports onset of a diffuse bilateral anterior \"squeezing-type\" chest discomfort approximately 1 hour prior to presentation while driving.  The discomfort did not radiate.  It was associated with shortness of breath.  The discomfort had a 10/10 intensity.  The discomfort was improved to 8/10 intensity with sublingual nitroglycerin.  There were no other associated symptoms.  On presentation to the emergency room the twelve-lead electrocardiogram showed a paced atrial rhythm with normal AV conduction.  There was poor R wave voltage across the precordium.  There was significant J-point elevation in leads V1 and V3.  She demonstrates a prominent interventricular conduction delay as well.  There was downsloping ST segment depression in 1 and aVL possibly LVH with strain versus reciprocal changes.  The patient had a permanent pacemaker placed years ago for sinus bradycardia.  She was previously followed by Dr. Leyva.  She has a history of hypertension, type 2 diabetes mellitus and dyslipidemia.  She has no prior history of myocardial infarction or coronary revascularization.  She is currently smoking 1/2 pack of cigarettes per day.  Review of Systems   Pertinent items are noted in HPI, all other systems reviewed and negative    History  Past Medical History:   Diagnosis Date   • Arthritis    • Back pain    • Back pain    • Colon polyp 02/24/2016   • Colon polyps 11/21/2019   • Depression 1998   • Diabetes mellitus (HCC) 2002    Type 2 "   • Dizziness    • GERD (gastroesophageal reflux disease)    • High triglycerides 1998   • History of cataract    • History of kidney stones    • History of stroke    • Hyperlipidemia 1998   • Hypertension    • Kidney failure 2018   • Osteoarthritis    • PONV (postoperative nausea and vomiting)    • PTSD (post-traumatic stress disorder) 1998   • Shortness of breath      Past Surgical History:   Procedure Laterality Date   • APPENDECTOMY  1979   • CARDIAC PACEMAKER PLACEMENT  04/01/2011    khan   • CARPAL TUNNEL RELEASE     • CATARACT EXTRACTION     • CHOLECYSTECTOMY  2000   • COLONOSCOPY  02/24/2016   • COLONOSCOPY N/A 11/21/2019    Procedure: COLONOSCOPY WITH COLD BIOPSY POLYPECTOMY AND BIOPSIES;  Surgeon: Qamar Baird MD;  Location: [facility] ENDOSCOPY;  Service: Gastroenterology   • CYST REMOVAL     • EYE SURGERY Bilateral 2006    Cataract    • HEMORRHOIDECTOMY  2002   • HERNIA REPAIR     • HERNIA REPAIR Right 2009   • HYSTERECTOMY     • HYSTERECTOMY  1979   • KNEE SURGERY Bilateral     1991 and 2003   • PACEMAKER IMPLANTATION  2011   • UPPER GASTROINTESTINAL ENDOSCOPY  03/02/2016     Family History   Problem Relation Age of Onset   • No Known Problems Mother    • Colon cancer Father    • Stomach cancer Paternal Aunt    • Ovarian cancer Paternal Aunt      Social History     Tobacco Use   • Smoking status: Current Every Day Smoker     Packs/day: 0.50     Types: Cigarettes   • Smokeless tobacco: Never Used   Substance Use Topics   • Alcohol use: No     Comment: HX: 2006   • Drug use: No     E-cigarette/Vaping     E-cigarette/Vaping Substances     (Not in a hospital admission)    Allergies:  Diclofenac, Penicillins, Wheat bran, and Augmentin [amoxicillin-pot clavulanate]    Objective     Vital Signs  Temp:  [98.6 °F (37 °C)] 98.6 °F (37 °C)  Heart Rate:  [71] 71  Resp:  [18] 18  BP: (122)/(56) 122/56    Physical Exam:      General Appearance:    Alert, cooperative, in no acute distress   Head:    Normocephalic,  without obvious abnormality, atraumatic   Eyes:            Lids and lashes normal, conjunctivae and sclerae normal, no   icterus, no pallor, corneas clear, PERRLA   Ears:    Ears appear intact with no abnormalities noted   Throat:   No oral lesions, no thrush, oral mucosa moist   Neck:   No adenopathy, supple, trachea midline, no thyromegaly, no     carotid bruit, no JVD   Back:     No kyphosis present, no scoliosis present, no skin lesions,       erythema or scars, no tenderness to percussion or                   palpation,   range of motion normal   Lungs:     Clear to auscultation,respirations regular, even and                   unlabored    Heart:    Regular rhythm and normal rate, normal S1 and S2, no            murmur, no gallop, no rub, no click   Breast Exam:    Deferred   Abdomen:     Normal bowel sounds, no masses, no organomegaly, soft        non-tender, non-distended, no guarding, no rebound                 tenderness   Genitalia:    Deferred   Extremities:   Moves all extremities well, no edema, no cyanosis, no              redness   Pulses:   Pulses palpable and equal bilaterally   Skin:   No bleeding, bruising or rash   Lymph nodes:   No palpable adenopathy   Neurologic:   Cranial nerves 2 - 12 grossly intact, sensation intact, DTR        present and equal bilaterally     Results Review:    I reviewed the patient's new clinical results.  I reviewed the patient's new imaging results and agree with the interpretation.    Assessment & Plan     Possible acute coronary syndrome.    I have recommended coronary angiography with interventional standby.  Ms. Pedro has been engaged in a patient level discussion explaining the rationale for proceeding with invasive testing.  The procedure of coronary angiography with catheter-based coronary revascularization has been explained in detail, using layman's terminology, including risks benefits and alternatives.  She expresses understanding of desire to proceed.   Further recommendations will be predicated on the results of her catheterization findings.  The patient has been counseled regarding the essential need to discontinue cigarette smoking.    I discussed the patients findings and my recommendations with patient.     Dionicio Ibrahim MD  09/12/22  17:11 EDT        Electronically signed by Dionicio Ibrahim MD at 09/12/22 1716          Emergency Department Notes      Linwood Ellington PA-C at 09/12/22 1656          Subjective   PIT    Chief Complaint: Substernal squeezing chest pressure  History of Present Illness: 71-year-old female with a history of pacemaker implantation for bradycardia, non-insulin-dependent diabetes hyperlipidemia and hypertension comes in for evaluation of above complaint.  1 hour prior to arrival while driving in the car had acute onset of substernal squeezing chest pressure that did not radiate.  Chronic shortness of breath none worse than baseline.  No diaphoresis or nausea.  No history of CAD per her report.  Took an aspirin full-strength and 1 nitro prior to arrival pain went from a 10 out of 10 to an 8 out of 10.  Onset: 1 hour prior to arrival  Timing: Ongoing but slightly improved  Exacerbating / Alleviating factors: None  Associated symptoms: None      Nurses Notes reviewed and agree, including vitals, allergies, social history and prior medical history.          Review of Systems   Constitutional: Negative.    HENT: Negative.    Eyes: Negative.    Respiratory: Negative.    Cardiovascular: Positive for chest pain.   Gastrointestinal: Negative.    Genitourinary: Negative.    Musculoskeletal: Negative.    Skin: Negative.    Neurological: Negative.    Psychiatric/Behavioral: Negative.        Past Medical History:   Diagnosis Date   • Arthritis    • Back pain    • Back pain    • Colon polyp 02/24/2016   • Colon polyps 11/21/2019   • Depression 1998   • Diabetes mellitus (HCC) 2002    Type 2   • Dizziness    • GERD (gastroesophageal  reflux disease)    • High triglycerides 1998   • History of cataract    • History of kidney stones    • History of stroke    • Hyperlipidemia 1998   • Hypertension    • Kidney failure 2018   • Osteoarthritis    • PONV (postoperative nausea and vomiting)    • PTSD (post-traumatic stress disorder) 1998   • Shortness of breath        Allergies   Allergen Reactions   • Diclofenac Unknown (See Comments)     Causing patient kidney failure    • Penicillins Hives   • Wheat Bran Unknown (See Comments)     ADDITIONAL FOOD ALLERGIES PER PATIENT: cabbage, wheat, cucumbers, white potatoes   • Augmentin [Amoxicillin-Pot Clavulanate] Rash       Past Surgical History:   Procedure Laterality Date   • APPENDECTOMY  1979   • CARDIAC PACEMAKER PLACEMENT  04/01/2011    khan   • CARPAL TUNNEL RELEASE     • CATARACT EXTRACTION     • CHOLECYSTECTOMY  2000   • COLONOSCOPY  02/24/2016   • COLONOSCOPY N/A 11/21/2019    Procedure: COLONOSCOPY WITH COLD BIOPSY POLYPECTOMY AND BIOPSIES;  Surgeon: Qamar Baird MD;  Location: [facility] ENDOSCOPY;  Service: Gastroenterology   • CYST REMOVAL     • EYE SURGERY Bilateral 2006    Cataract    • HEMORRHOIDECTOMY  2002   • HERNIA REPAIR     • HERNIA REPAIR Right 2009   • HYSTERECTOMY     • HYSTERECTOMY  1979   • KNEE SURGERY Bilateral     1991 and 2003   • PACEMAKER IMPLANTATION  2011   • UPPER GASTROINTESTINAL ENDOSCOPY  03/02/2016       Family History   Problem Relation Age of Onset   • No Known Problems Mother    • Colon cancer Father    • Stomach cancer Paternal Aunt    • Ovarian cancer Paternal Aunt        Social History     Socioeconomic History   • Marital status:    Tobacco Use   • Smoking status: Current Every Day Smoker     Packs/day: 0.50     Types: Cigarettes   • Smokeless tobacco: Never Used   Substance and Sexual Activity   • Alcohol use: No     Comment: HX: 2006   • Drug use: No   • Sexual activity: Defer           Objective   Physical Exam  Vitals and nursing note reviewed.    Constitutional:       General: She is not in acute distress.     Appearance: She is well-developed and normal weight. She is not ill-appearing, toxic-appearing or diaphoretic.   HENT:      Head: Normocephalic and atraumatic.   Cardiovascular:      Rate and Rhythm: Normal rate and regular rhythm.      Heart sounds: Normal heart sounds. No murmur heard.  Pulmonary:      Breath sounds: Normal breath sounds.   Abdominal:      Palpations: Abdomen is soft.   Musculoskeletal:         General: Normal range of motion.      Cervical back: Normal range of motion.   Neurological:      General: No focal deficit present.      Mental Status: She is alert.   Psychiatric:         Mood and Affect: Mood normal.         Behavior: Behavior normal.         Procedures          ED Course  ED Course as of 09/12/22 1701   Mon Sep 12, 2022   1655 EKG interpretation 16:49.  EKG interpreted by me reveals a paced rhythm rate of 69.  There is ST elevation in lead V3, aVR.  Subtle ST depression in lead V6. concern for ischemia.  Discussed with Dr. Ibrahim, he reviewed EKG, recommended activation of Cath Lab. [PF]      ED Course User Index  [PF] Carlos Yao, DO                                           MDM  Dr. Ibrahim contacted by Dr. Yao, Cath Lab for STEMI.    30 minutes of critical care provided. This time excludes other billable procedures. Time does include preparation of documents, medical consultations, review of old records, and direct bedside care. Patient is at high risk for life-threatening deterioration due to STEMI.   Final diagnoses:   ST elevation myocardial infarction (STEMI), unspecified artery (HCC)       ED Disposition  ED Disposition     ED Disposition   Send to Cath Lab    Condition   --    Comment   --             No follow-up provider specified.       Medication List      No changes were made to your prescriptions during this visit.          Linwood Ellington PA-C  09/12/22 2639       Linwood Ellington  ORLIN Mosquera  09/12/22 1701       Linwood Ellington PA-C  09/12/22 1820      Electronically signed by Linwood Ellington PA-C at 09/12/22 1820       Vital Signs (last day)     Date/Time Temp Temp src Pulse Resp BP Patient Position SpO2    09/13/22 0739 97.7 (36.5) Oral 73 20 140/58 Lying 92    09/13/22 0700 -- -- 70 -- 132/61 -- 92    09/13/22 0500 -- -- 70 -- 114/54 -- 92    09/13/22 0359 -- -- 70 -- -- -- 93    09/13/22 0300 97.8 (36.6) Oral 70 16 117/54 Lying 93    09/13/22 0100 -- -- 70 -- 116/51 -- 93    09/12/22 2339 97.9 (36.6) Oral 70 16 117/53 Lying 87    09/12/22 2300 -- -- 71 -- 117/53 -- 89    09/12/22 2230 -- -- 70 -- -- -- --    09/12/22 2200 -- -- 71 -- 166/55 -- --    09/12/22 2130 -- -- 70 -- 162/82 -- --    09/12/22 2100 -- -- 70 -- 151/66 -- --    09/12/22 2045 -- -- 70 -- 145/62 -- --    09/12/22 2015 -- -- 70 -- 139/56 -- --    09/12/22 2000 -- -- 71 -- -- -- --    09/12/22 1945 -- -- 72 -- 134/62 -- 94    09/12/22 1930 -- -- 69 -- 140/62 -- 93    09/12/22 1915 -- -- 72 -- 124/57 -- 97    09/12/22 1901 97.8 (36.6) Oral 70 18 114/53 Lying 94    09/12/22 1809 98.2 (36.8) Oral 70 18 144/59 Lying --    09/12/22 17:51:34 -- -- 70 -- 127/67 -- 95    09/12/22 17:36:57 -- -- 70 -- 144/76 -- 97    09/12/22 17:36:13 -- -- -- -- -- -- 97    09/12/22 1634 98.6 (37) Oral 71 18 122/56 Sitting 97            Current Facility-Administered Medications   Medication Dose Route Frequency Provider Last Rate Last Admin   • acetaminophen (TYLENOL) tablet 650 mg  650 mg Oral Q4H PRN Dionicio Ibrahim MD   650 mg at 09/12/22 2235   • ALPRAZolam (XANAX) tablet 0.25 mg  0.25 mg Oral TID PRN Dionicio Ibrahim MD       • aspirin EC tablet 81 mg  81 mg Oral Daily Dionicio Ibrahim MD   81 mg at 09/13/22 0816   • atorvastatin (LIPITOR) tablet 20 mg  20 mg Oral Q PM Dionicio Ibrahim MD       • baclofen (LIORESAL) tablet 10 mg  10 mg Oral Nightly Jarett Carlos DO   10 mg at 09/12/22 2235   • bisoprolol  (ZEBeta) tablet 10 mg  10 mg Oral Daily Dionicio Ibrahim MD   10 mg at 09/13/22 0816   • busPIRone (BUSPAR) tablet 15 mg  15 mg Oral TID Jarett Carlos DO   15 mg at 09/13/22 0816   • diphenhydrAMINE (BENADRYL) capsule 25 mg  25 mg Oral Q6H PRN Dionicio Ibrahim MD   25 mg at 09/13/22 0816   • ezetimibe (ZETIA) tablet 10 mg  10 mg Oral Nightly Dionicio Ibrahim MD       • HYDROcodone-acetaminophen (NORCO) 5-325 MG per tablet 1 tablet  1 tablet Oral Q4H PRN Dionicio Ibrahim MD       • meclizine (ANTIVERT) tablet 25 mg  25 mg Oral TID PRN Dionicio Ibrahim MD       • Morphine sulfate (PF) injection 4 mg  4 mg Intravenous Q1H PRN Dionicio Ibrahim MD        And   • naloxone (NARCAN) injection 0.4 mg  0.4 mg Intravenous Q5 Min PRN Dionicio Ibrahim MD       • nicotine (NICODERM CQ) 14 MG/24HR patch 1 patch  1 patch Transdermal Q24H Jarett Carlos DO   1 patch at 09/13/22 0817   • ondansetron (ZOFRAN) tablet 4 mg  4 mg Oral Q6H PRN Dionicio Ibrahim MD        Or   • ondansetron (ZOFRAN) injection 4 mg  4 mg Intravenous Q6H PRN Dionicio Ibrahim MD       • pantoprazole (PROTONIX) EC tablet 40 mg  40 mg Oral QAM Dionicio Ibrahim MD   40 mg at 09/13/22 0608   • sertraline (ZOLOFT) tablet 100 mg  100 mg Oral Daily Dionicio Ibrahim MD   100 mg at 09/13/22 0816   • sodium chloride 0.9 % flush 10 mL  10 mL Intravenous PRN Dionicio Ibrahim MD       • temazepam (RESTORIL) capsule 15 mg  15 mg Oral Nightly PRN Dionicio Ibrahim MD       • traZODone (DESYREL) tablet 100 mg  100 mg Oral Nightly PRN Dionicio Ibrahim MD   100 mg at 09/12/22 2235     Operative/Procedure Notes (last 24 hours)  Notes from 09/12/22 1051 through 09/13/22 1051   No notes of this type exist for this encounter.

## 2022-09-13 NOTE — CONSULTS
"    [facility] HOSPITALIST   CONSULTATION      Name:  Lucy Pedro   Age:  71 y.o.  Sex:  female  :  1950  MRN:  [id number]   Visit Number:  [id number]  Admission Date:  2022  Date Of Service:  22  Primary Care Physician:  Debbi Yu MD    Consulting Physician:    Jarett Carlos DO    Referring Physician:    Dr. Ibrahim    Reason For Consult:    Medical management, noncardiac chest pain    Chief Complaint:     Chest pain    History Of Presenting Illness:    Patient is a 71-year-old female history significant for hypertension, hyperlipidemia, and tobacco abuse who presents to the ER today with acute onset bilateral chest pressure that was described as a \"squeezing\".  Troponin was negative, EKG reviewed by cardiology and patient taken emergently for left heart catheterization which was unremarkable.  Patient admitted to the floor for overnight observation with hospitalist consult.  Patient reports that she has never experienced pain like this before today.  She has been in her normal state of health.  Denies any emotional or stressful event.  Denies a history of anxiety.  Denied associated shortness of breath, nausea vomiting.  Currently, patient reports that chest pain is improving.  Her only complaint is that she is sleepy.  Again, she denies shortness of breath, cough, fever/chills, nausea vomiting or diarrhea.  Resting comfortably, no acute events per nursing staff status post left heart catheterization.    Review Of Systems:     All systems were reviewed and negative unless previously mentioned in the HPI.    Past Medical History: Patient  has a past medical history of Arthritis, Back pain, Back pain, Colon polyp (2016), Colon polyps (2019), Depression (), Diabetes mellitus (HCC) (), Dizziness, GERD (gastroesophageal reflux disease), High triglycerides (), History of cataract, History of kidney stones, History of stroke, " Hyperlipidemia (1998), Hypertension, Kidney failure (2018), Osteoarthritis, PONV (postoperative nausea and vomiting), PTSD (post-traumatic stress disorder) (1998), Shortness of breath, and ST elevation myocardial infarction (STEMI), unspecified artery (HCC) (9/12/2022).    Past Surgical History: Patient  has a past surgical history that includes Hernia repair; Carpal tunnel release; Cyst Removal; Hysterectomy; Cardiac pacemaker placement (04/01/2011); Cataract extraction; Knee surgery (Bilateral); Pacemaker Implantation (2011); Hysterectomy (1979); Hernia repair (Right, 2009); Eye surgery (Bilateral, 2006); Appendectomy (1979); Cholecystectomy (2000); Colonoscopy (02/24/2016); Upper gastrointestinal endoscopy (03/02/2016); Hemorrhoid surgery (2002); and Colonoscopy (N/A, 11/21/2019).    Social History: Patient  reports that she has been smoking cigarettes. She has been smoking about 0.50 packs per day. She has never used smokeless tobacco. She reports that she does not drink alcohol and does not use drugs.    Family History: Patient's family history has been reviewed and found to be non-contributory.     Allergies:      Diclofenac, Penicillins, Wheat bran, and Augmentin [amoxicillin-pot clavulanate]    Home Medications:    Prior to Admission Medications     Prescriptions Last Dose Informant Patient Reported? Taking?    aspirin 81 MG EC tablet 9/11/2022 Self Yes Yes    Take 81 mg by mouth Every Night.    atorvastatin (LIPITOR) 20 MG tablet 9/11/2022 Self Yes Yes    Take 80 mg by mouth Every Evening.    diphenhydrAMINE HCl (BENADRYL ALLERGY PO) 9/12/2022 Self Yes Yes    Take 1 tablet by mouth 2 (Two) Times a Day.    ezetimibe (ZETIA) 10 MG tablet 9/12/2022 Self Yes Yes    Take 10 mg by mouth Daily.    Fluticasone-Salmeterol (ADVAIR/WIXELA) 100-50 MCG/ACT DISKUS 9/11/2022  Yes Yes    Inhale 2 (Two) Times a Day.    meclizine 25 MG chewable tablet chewable tablet 9/12/2022 Self Yes Yes    Chew 25 mg 3 (Three) Times a Day  As Needed.    metFORMIN (GLUCOPHAGE) 850 MG tablet 9/12/2022 Self Yes Yes    500 mg.    omeprazole (priLOSEC) 40 MG capsule 9/12/2022 Self Yes Yes    TAKE 1 CAPSULE BY MOUTH DAILY AS NEEDED FOR HEART BURN    sertraline (ZOLOFT) 100 MG tablet 9/12/2022 Self Yes Yes    TAKE 1 TABLET EVERY DAY FOR MOOD AND OR ANXIETY    traZODone (DESYREL) 100 MG tablet 9/11/2022 Self Yes Yes    TAKE 1 TABLET EVERY NIGHT AT BEDTIME FOR SLEEP    TRUE METRIX BLOOD GLUCOSE TEST test strip 9/12/2022  Yes Yes    USE TO CHECK BLOOD SUGAR ONCE A DAY    TRUEPLUS LANCETS 30G misc 9/12/2022  Yes Yes    USE TO TEST ONCE DAILY AS DIRECTED    albuterol sulfate  (90 Base) MCG/ACT inhaler   Yes No    Inhale 2 puffs Every 4 (Four) Hours As Needed for Wheezing.    baclofen (LIORESAL) 10 MG tablet   Yes No    Take 10 mg by mouth Every Night.    bisoprolol (ZEBeta) 5 MG tablet  Self Yes No    TAKE 1 TABLET EVERY DAY FOR BLOOD PRESSURE    vitamin B-12 (CYANOCOBALAMIN) 1000 MCG tablet   Yes No    Take 1,000 mcg by mouth Daily.           Medication Review:     I have reviewed the patient's active and prn medications.      Vital Signs:    Temp:  [97.8 °F (36.6 °C)-98.6 °F (37 °C)] 97.8 °F (36.6 °C)  Heart Rate:  [69-72] 70  Resp:  [18] 18  BP: (114-145)/(53-76) 145/62        09/12/22  1634 09/12/22  1843   Weight: 64 kg (141 lb) 64 kg (141 lb 1.5 oz)       Body mass index is 26.67 kg/m².    Physical Exam:     General Appearance:  Alert and cooperative.    Head:  Atraumatic and normocephalic.   Eyes: Conjunctivae and sclerae normal, no icterus. No pallor.   Ears:  Ears with no abnormalities noted.   Throat: No oral lesions, no thrush, oral mucosa moist.   Neck: Supple, trachea midline, no thyromegaly.   Back:   No kyphoscoliosis present. No tenderness to palpation.   Lungs:   Breath sounds heard bilaterally equally.  No crackles or wheezing. No Pleural rub or bronchial breathing.   Heart:  Normal S1 and S2, no murmur, no gallop, no rub. No JVD.    Abdomen:   Normal bowel sounds, no masses, no organomegaly. Soft, nontender, nondistended, no rebound tenderness.   Extremities: Supple, no edema, no cyanosis, no clubbing.   Pulses: Pulses palpable bilaterally.   Skin: No bleeding or rash.   Neurologic: Alert and oriented x 3. No facial asymmetry. Moves all four limbs. No tremors.      Laboratory data:    Results from last 7 days   Lab Units 09/12/22  1650   SODIUM mmol/L 140   POTASSIUM mmol/L 4.5   CHLORIDE mmol/L 107   CO2 mmol/L 20.0*   BUN mg/dL 16   CREATININE mg/dL 0.99   CALCIUM mg/dL 10.0   BILIRUBIN mg/dL 0.3   ALK PHOS U/L 75   ALT (SGPT) U/L 23   AST (SGOT) U/L 23   GLUCOSE mg/dL 84     Results from last 7 days   Lab Units 09/12/22  1650   WBC 10*3/mm3 12.67*   HEMOGLOBIN g/dL 13.8   HEMATOCRIT % 40.3   PLATELETS 10*3/mm3 266         Results from last 7 days   Lab Units 09/12/22  1650   TROPONIN T ng/mL <0.010                           Invalid input(s): USDES,  BLOODU, NITRITITE, BACT, EP  Pain Management Panel    There is no flowsheet data to display.             EKG:          Radiology:    Adult Transthoracic Echo Complete W/ Cont if Necessary Per Protocol    Result Date: 9/12/2022  · Estimated left ventricular EF = 64% Left ventricular ejection fraction appears to be 61 - 65%. Left ventricular systolic function is normal. · Left ventricular diastolic function is consistent with (grade I) impaired relaxation. · Estimated right ventricular systolic pressure from tricuspid regurgitation is normal (<35 mmHg).      Cardiac Catheterization/Vascular Study    Result Date: 9/12/2022  Recommendations: · Evaluate nonischemic causes of chest pain · Emphasis on secondary risk factor modification Impression: Angiographically near normal coronary arteries No evidence of myocardial infarction Coronary calcification noted Indication: Suspected anterior ST elevation myocardial infarction (diagnosis excluded)-false alarm  --------------------------------------------------------------------------- ------------------------------------------- Clinical History: This is a 71-year-old female patient with no prior history of heart disease who presented to the emergency room with 1 hour of severe diffuse bilateral anterior chest pressure.  Twelve-lead electrocardiogram showed ST elevation in leads V1 and V3 with possible reciprocal ST depression in leads I and aVL.  The STEMI protocol was activated. Procedures performed: 1. Bilateral selective coronary angiography  Access:   5\6 Maldivian Slender arterial hemostasis sheath placed via right radial artery; arterial sheath removed in the cardiac catheterization laboratory with application of a transradial band for patent hemostasis. Procedure narrative: The procedure was explained in detail to the patient, including risks benefits and alternatives.  The patient expressed understanding and a desire to proceed. Ochoa's testing demonstrated excellent collateral circulation to both hands.  The patient was brought to the cardiac catheterization laboratory where the right wrist was prepped and draped in usual sterile fashion.  One percent lidocaine was infiltrated into the skin overlying the right radial artery.  Access was obtained from the right radial artery utilizing the micropuncture technique and a 5\6 Mal[place] Slender hemostasis sheath was placed without difficulty.  The standard antispasm cocktail as well as 4000 units of unfractionated heparin was administered.  Retrograde catheterization was performed using a 6 Maldivian JR4 diagnostic catheter to engage  the right coronary artery and a 6 Maldivian Tiger diagnostic catheter to engage the left main coronary artery.  Hand injected angiograms was performed.  Contrast ventriculogram was not performed.  Estimated Blood Loss: Negligible Total Contrast: 70 mL Fluoro Time: 1.5 minutes Radiation Dose: 121 mGy (air kerma) Angiographic Findings: · Coronary  circulation is right dominant · Left main: The left main coronary artery divides into the left anterior descending and circumflex coronary arteries.  The left main coronary artery has no significant disease. · LAD: The left anterior descending gives rise to diagonal branches as well as multiple septal perforators before terminating as an apical recurrent branch.  The left anterior descending has no focal obstructive disease. · LCX: The circumflex coronary artery is a nondominant vessel giving rise to the obtuse marginal branches.  Circumflex coronary artery and its branches have no significant disease. · RCA: The right coronary artery is dominant for the posterior circulation.  The ostium of the right coronary artery demonstrates coronary calcification.  The right coronary artery and its branches have no significant disease. Complications: None apparent       Assessment:     1. Noncardiac chest pain-POA  2. Hypertension  3. Hyperlipidemia  4. GERD  5. Anxiety/depression  6. Tobacco abuse    Recommendations/Plan:     Noncardiac chest pain  - 2D echocardiogram revealed EF 64%, grade 1 diastolic dysfunction  - D-dimer negative, ruled out pulmonary embolism  - Left heart catheterization revealed no evidence of myocardial infarction, angiographically near normal coronary arteries  - Patient currently chest pain-free  -Unclear etiology of patient's acute onset chest pain- differential includes anxiety, coronary vasospasm, musculoskeletal  - HbA1c and lipid panel pending    Tobacco abuse  - Nicotine patch  - Counseled extensively on tobacco cessation    Continue home medications as ordered for chronic conditions.    Thank you for this consult. Please do not hesitate to call me for any questions.    Jarett Carlos DO  09/12/22  22:01 EDT    Dictated utilizing Dragon dictation.

## 2022-09-13 NOTE — PROGRESS NOTES
Exercise Oximetry    Patient Name:Lucy Pedro   MRN: [id number]   Date: 09/13/22             ROOM AIR BASELINE   SpO2% 94   Heart Rate 76   Blood Pressure 122/57     EXERCISE ON ROOM AIR SpO2% EXERCISE ON O2 @  LPM SpO2%   1 MINUTE 93 1 MINUTE    2 MINUTES 92 2 MINUTES    3 MINUTES 93 3 MINUTES    4 MINUTES 92 4 MINUTES    5 MINUTES 93 5 MINUTES    6 MINUTES 93 6 MINUTES               Distance Walked  100 Distance Walked   Dyspnea ([place] Scale)  5 Dyspnea ([place]lia Scale)   Fatigue ([place] Scale)  4 Fatigue ([place]lia Scale)   SpO2% Post Exercise  93 SpO2% Post Exercise   HR Post Exercise  78 HR Post Exercise   Time to Recovery  0 Time to Recovery     Comments: Patient resting room air SpO2 was 94% and did not drop below 92% with ambulation.

## 2022-09-13 NOTE — CASE MANAGEMENT/SOCIAL WORK
Discharge Planning Assessment  [facility]     Patient Name: Lucy Pedro  MRN: [id number]  Today's Date: 9/13/2022    Admit Date: 9/12/2022     Discharge Needs Assessment    No documentation.                Discharge Plan     Row Name 09/13/22 0953       Plan    Plan CM met with pt at bedside. Able to verify demographics. She has no  LW or POA, and has not been an inpatient in last 30 days. She and spouse, Brandon Pedro, and have been at current address 9 yrs. Her primary is [facility]bi Yu and pharmacy is [facility]; declined meds to bed.She and spouse share household tasks; independent with ADL's.Home DME includes glucometer, pulse ox, and bp cuff. Pt has a walker from a previous surgery. Denies financial and d/c concerns at this time.              Continued Care and Services - Admitted Since 9/12/2022    Coordination has not been started for this encounter.       Expected Discharge Date and Time     Expected Discharge Date Expected Discharge Time    Sep 13, 2022          Demographic Summary     Row Name 09/13/22 0951       General Information    Admission Type inpatient    Arrived From emergency department    Required Notices Provided Important Message from Medicare    Referral Source admission list    Reason for Consult discharge planning    Preferred Language English       Contact Information    Permission Granted to Share Info With family/designee               Functional Status     Row Name 09/13/22 0951       Functional Status    Usual Activity Tolerance good    Current Activity Tolerance good       Functional Status, IADL    Medications independent    Meal Preparation independent;assistive person    Housekeeping assistive person;independent    Laundry assistive person;independent    Shopping independent;assistive person    IADL Comments she ansd spouse shared household task       Mental Status    General Appearance WDL WDL       Mental Status Summary    Recent Changes in Mental  Status/Cognitive Functioning no changes       Employment/    Employment Status retired               Psychosocial    No documentation.                Abuse/Neglect     Row Name 09/13/22 1007       Personal Safety    Feels Unsafe at Home or Work/School no               Legal    No documentation.                Substance Abuse    No documentation.                Patient Forms    No documentation.                   Lisa Haro RN

## 2022-09-13 NOTE — CASE MANAGEMENT/SOCIAL WORK
Case Management Discharge Note                Selected Continued Care - Admitted Since 9/12/2022     Destination    No services have been selected for the patient.              Durable Medical Equipment    No services have been selected for the patient.              Dialysis/Infusion    No services have been selected for the patient.              Home Medical Care    No services have been selected for the patient.              Therapy    No services have been selected for the patient.              Community Resources    No services have been selected for the patient.              [facility]    No services have been selected for the patient.                  Transportation Services  Private: Car    Final Discharge Disposition Code: 01 - home or self-care

## 2022-09-14 NOTE — OUTREACH NOTE
Prep Survey    Flowsheet Row Responses   [facility] facility patient discharged from? [facility]   Is LACE score < 7 ? Yes   Emergency Room discharge w/ pulse ox? No   Eligibility Readm Mgmt   Discharge diagnosis Non-cardiac chest pain   Does the patient have one of the following disease processes/diagnoses(primary or secondary)? Other   Does the patient have Home health ordered? No   Is there a DME ordered? No   Prep survey completed? Yes          BROOK COLON - Registered Nurse

## 2022-11-08 ENCOUNTER — HOSPITAL ENCOUNTER (OUTPATIENT)
Dept: GENERAL RADIOLOGY | Facility: HOSPITAL | Age: 72
Discharge: HOME | End: 2022-11-08
Admitting: UROLOGY

## 2022-11-08 ENCOUNTER — TRANSCRIBE ORDERS (OUTPATIENT)
Dept: GENERAL RADIOLOGY | Facility: HOSPITAL | Age: 72
End: 2022-11-08

## 2022-11-08 DIAGNOSIS — N20.0 URIC ACID NEPHROLITHIASIS: Primary | ICD-10-CM

## 2022-11-08 DIAGNOSIS — N20.0 URIC ACID NEPHROLITHIASIS: ICD-10-CM

## 2022-11-08 PROCEDURE — 74018 RADEX ABDOMEN 1 VIEW: CPT

## 2022-11-22 ENCOUNTER — TRANSCRIBE ORDERS (OUTPATIENT)
Dept: ADMINISTRATIVE | Facility: HOSPITAL | Age: 72
End: 2022-11-22

## 2022-11-22 DIAGNOSIS — N20.0 CALCULUS OF KIDNEY: Primary | ICD-10-CM

## 2022-12-06 ENCOUNTER — HOSPITAL ENCOUNTER (OUTPATIENT)
Dept: CT IMAGING | Facility: HOSPITAL | Age: 72
Discharge: HOME | End: 2022-12-06

## 2022-12-06 ENCOUNTER — LAB (OUTPATIENT)
Dept: LAB | Facility: HOSPITAL | Age: 72
End: 2022-12-06

## 2022-12-06 ENCOUNTER — TRANSCRIBE ORDERS (OUTPATIENT)
Dept: LAB | Facility: HOSPITAL | Age: 72
End: 2022-12-06

## 2022-12-06 DIAGNOSIS — N20.0 URIC ACID NEPHROLITHIASIS: ICD-10-CM

## 2022-12-06 DIAGNOSIS — N20.0 CALCULUS OF KIDNEY: ICD-10-CM

## 2022-12-06 DIAGNOSIS — N20.0 URIC ACID NEPHROLITHIASIS: Primary | ICD-10-CM

## 2022-12-06 LAB
ALBUMIN SERPL-MCNC: 4 G/DL (ref 3.5–5.2)
ALBUMIN/GLOB SERPL: 1.3 G/DL
ALP SERPL-CCNC: 66 U/L (ref 39–117)
ALT SERPL W P-5'-P-CCNC: 19 U/L (ref 1–33)
ANION GAP SERPL CALCULATED.3IONS-SCNC: 12.5 MMOL/L (ref 5–15)
AST SERPL-CCNC: 21 U/L (ref 1–32)
BILIRUB SERPL-MCNC: 0.3 MG/DL (ref 0–1.2)
BUN SERPL-MCNC: 18 MG/DL (ref 8–23)
BUN/CREAT SERPL: 16.2 (ref 7–25)
CALCIUM SPEC-SCNC: 9.8 MG/DL (ref 8.6–10.5)
CHLORIDE SERPL-SCNC: 105 MMOL/L (ref 98–107)
CO2 SERPL-SCNC: 24.5 MMOL/L (ref 22–29)
CREAT SERPL-MCNC: 1.11 MG/DL (ref 0.57–1)
EGFRCR SERPLBLD CKD-EPI 2021: 52.6 ML/MIN/1.73
GLOBULIN UR ELPH-MCNC: 3 GM/DL
GLUCOSE SERPL-MCNC: 124 MG/DL (ref 65–99)
POTASSIUM SERPL-SCNC: 4.3 MMOL/L (ref 3.5–5.2)
PROT SERPL-MCNC: 7 G/DL (ref 6–8.5)
SODIUM SERPL-SCNC: 142 MMOL/L (ref 136–145)

## 2022-12-06 PROCEDURE — 74176 CT ABD & PELVIS W/O CONTRAST: CPT

## 2022-12-06 PROCEDURE — 36415 COLL VENOUS BLD VENIPUNCTURE: CPT

## 2022-12-06 PROCEDURE — 80053 COMPREHEN METABOLIC PANEL: CPT

## (undated) DEVICE — CATH F6 ST JR 4 100CM: Brand: SUPERTORQUE

## (undated) DEVICE — GW INQWIRE FC PTFE STD J/1.5 .035 260

## (undated) DEVICE — ENDOSCOPY PORT CONNECTOR FOR OLYMPUS® SCOPES: Brand: ERBE

## (undated) DEVICE — Device: Brand: DEFENDO AIR/WATER/SUCTION AND BIOPSY VALVE

## (undated) DEVICE — GW EMR FIX EXCHG J STD .035 3MM 260CM

## (undated) DEVICE — Device

## (undated) DEVICE — FRCP BIOP COLD ENDOJAW ALLGTR W/NDL 2.8X2300MM BLU

## (undated) DEVICE — RADIFOCUS OPTITORQUE ANGIOGRAPHIC CATHETER: Brand: OPTITORQUE

## (undated) DEVICE — CVR PROB ULTRASND GLS STRL

## (undated) DEVICE — GLIDESHEATH SLENDER STAINLESS STEEL KIT: Brand: GLIDESHEATH SLENDER

## (undated) DEVICE — LUBE JELLY PK/2.75GM STRL BX/144

## (undated) DEVICE — PAD GRND REM POLYHESIVE A/ DISP

## (undated) DEVICE — TR BAND RADIAL ARTERY COMPRESSION DEVICE: Brand: TR BAND

## (undated) DEVICE — HYBRID TUBING/CAP SET FOR OLYMPUS® SCOPES: Brand: ERBE